# Patient Record
Sex: MALE | Race: BLACK OR AFRICAN AMERICAN | NOT HISPANIC OR LATINO | Employment: FULL TIME | URBAN - METROPOLITAN AREA
[De-identification: names, ages, dates, MRNs, and addresses within clinical notes are randomized per-mention and may not be internally consistent; named-entity substitution may affect disease eponyms.]

---

## 2018-07-25 ENCOUNTER — HOSPITAL ENCOUNTER (EMERGENCY)
Facility: HOSPITAL | Age: 38
Discharge: HOME | End: 2018-07-25
Attending: EMERGENCY MEDICINE | Admitting: EMERGENCY MEDICINE
Payer: COMMERCIAL

## 2018-07-25 VITALS
HEART RATE: 96 BPM | BODY MASS INDEX: 32.61 KG/M2 | WEIGHT: 275 LBS | OXYGEN SATURATION: 100 % | TEMPERATURE: 97.3 F | SYSTOLIC BLOOD PRESSURE: 141 MMHG | DIASTOLIC BLOOD PRESSURE: 88 MMHG | RESPIRATION RATE: 16 BRPM

## 2018-07-25 DIAGNOSIS — B35.3 ATHLETE'S FOOT ON RIGHT: Primary | ICD-10-CM

## 2018-07-25 PROCEDURE — 99283 EMERGENCY DEPT VISIT LOW MDM: CPT

## 2018-07-25 RX ORDER — CLOTRIMAZOLE 1 %
CREAM (GRAM) TOPICAL
Qty: 15 G | Refills: 0 | Status: SHIPPED | OUTPATIENT
Start: 2018-07-25 | End: 2019-03-25

## 2018-07-26 NOTE — ED PROVIDER NOTES
History  Chief Complaint   Patient presents with    Toe Injury     small avulsion behind 4th digit and in between 3rd and 4th digit (he stuck a safty pin between toes to pop blister)     Pt in ER with c/o pain in between his 3rd and 4th toes  He noticed a blister which he popped, after which he noticed a small cut behind the 4th toe  Pt works as a , wears socks and boots for long hours  He denies trauma            Prior to Admission Medications   Prescriptions Last Dose Informant Patient Reported? Taking? albuterol (PROVENTIL HFA,VENTOLIN HFA) 90 mcg/act inhaler   Yes No   Sig: Inhale 2 puffs every 6 (six) hours as needed for wheezing      Facility-Administered Medications: None       Past Medical History:   Diagnosis Date    Asthma        Past Surgical History:   Procedure Laterality Date    CHOLECYSTECTOMY      CHOLECYSTECTOMY LAPAROSCOPIC N/A 10/7/2016    Procedure: CHOLECYSTECTOMY LAPAROSCOPIC, POSS  OPEN;  Surgeon: Berny Dey MD;  Location: [address] [place] [address];  Service:     NO PAST SURGERIES         History reviewed  No pertinent family history  I have reviewed and agree with the history as documented  Social History   Substance Use Topics    Smoking status: Former Smoker     Packs/day: 1 00     Years: 5 00     Types: Cigarettes, Cigars     Quit date: 10/5/2015    Smokeless tobacco: Never Used    Alcohol use 0 6 oz/week     1 Cans of beer per week      Comment: weekends        Review of Systems   Skin: Positive for wound  All other systems reviewed and are negative  Physical Exam  Physical Exam   Constitutional: He appears well-developed and well-nourished  No distress  Musculoskeletal:        Right foot: There is normal range of motion, no tenderness, no bony tenderness, no swelling, normal capillary refill, no crepitus, no deformity and no laceration  Feet:    Intertriginous area between 3rd and 4th finger moist  No cellulitis noted   No bony tenderness   Nursing note and vitals reviewed  Vital Signs  ED Triage Vitals [07/25/18 2049]   Temperature Pulse Respirations Blood Pressure SpO2   (!) 97 3 °F (36 3 °C) 96 16 141/88 100 %      Temp Source Heart Rate Source Patient Position - Orthostatic VS BP Location FiO2 (%)   Tympanic Monitor Sitting Left arm --      Pain Score       No Pain           Vitals:    07/25/18 2049   BP: 141/88   Pulse: 96   Patient Position - Orthostatic VS: Sitting       Visual Acuity      ED Medications  Medications - No data to display    Diagnostic Studies  Results Reviewed     None                 No orders to display              Procedures  Procedures       Phone Contacts  ED Phone Contact    ED Course                               MDM  Number of Diagnoses or Management Options  Athlete's foot on right:   Diagnosis management comments: Will start Lotrimin and refer pt to pods  Advised pt to change his socks often and keep foot dry  CritCare Time    Disposition  Final diagnoses:   Athlete's foot on right     Time reflects when diagnosis was documented in both MDM as applicable and the Disposition within this note     Time User Action Codes Description Comment    7/25/2018  9:18 PM Cj Cintron Add [B35 3] Athlete's foot on right       ED Disposition     ED Disposition Condition Comment    Discharge  [facility] [address] [facility] to home/self care      Condition at discharge: Stable        Follow-up Information     Follow up With Specialties Details Why Contact Info    Ginny Patch, DPM Podiatry Schedule an appointment as soon as possible for a visit in 1 day for follow up [facility]            Discharge Medication List as of 7/25/2018  9:20 PM      START taking these medications    Details   clotrimazole (LOTRIMIN) 1 % cream Apply to affected area 2 times daily, Print         CONTINUE these medications which have NOT CHANGED    Details   albuterol (PROVENTIL HFA,VENTOLIN HFA) 90 mcg/act inhaler Inhale 2 puffs every 6 (six) hours as needed for wheezing, Until Discontinued, Historical Med           No discharge procedures on file      ED Provider  Electronically Signed by           Adrienne Chua DO  07/26/18 3609

## 2018-07-26 NOTE — DISCHARGE INSTRUCTIONS
Athlete's Foot   WHAT YOU NEED TO KNOW:   Athlete's foot is a foot infection caused by a fungus  DISCHARGE INSTRUCTIONS:   Return to the emergency department if:   · You have a fever or chills  · You have red streaks going up your leg  Contact your healthcare provider if:   · Your infection spreads to other parts of your body  · Your infection is not better in 14 days or is not completely gone in 90 days  · The skin on your foot or leg is red and hot  · You have questions or concerns about your condition or care  Medicines:   · Antifungal medicine  may be given as a cream or pill  You may need a doctor's order for this medicine  Take the medicine until it is gone, even if your feet look like they are healed  · Take your medicine as directed  Contact your healthcare provider if you think your medicine is not helping or if you have side effects  Tell him or her if you are allergic to any medicine  Keep a list of the medicines, vitamins, and herbs you take  Include the amounts, and when and why you take them  Bring the list or the pill bottles to follow-up visits  Carry your medicine list with you in case of an emergency  Follow up with your healthcare provider as directed:  Write down your questions so you remember to ask them during your visits  Prevent the spread of athlete's foot:   · Prevent the spread of this infection to other parts of your body  When you shower, dry your groin area and other parts of your body before you dry your feet  · Keep your feet clean and dry  Wash your feet each day and dry them well, especially between your toes  After your feet are dry, put powder on your feet and between your toes  Wear clean cotton or wool socks each day  Put your socks on first so you do not spread the infection to other areas of your body  Wear sandals, canvas tennis shoes, or other shoes that allow air to flow to your feet  This helps keep your feet dry   Do not use shoes that are tight, or made of plastic or rubber  · Soak your feet in an astringent (drying) solution as directed  if you have blisters  You may need to do this for 20 to 30 minutes, 2 times each day to help dry out the blisters  · Wear shoes in public areas  Wear shower shoes or sandals in warm, damp areas  This includes shower stalls, near swimming pools, and locker rooms  Do not share socks or shoes  Do not use public swimming pools  © 2017 [facility] Information is for End User's use only and may not be sold, redistributed or otherwise used for commercial purposes  All illustrations and images included in CareNotes® are the copyrighted property of [employer] , [employer]  or Maxime Harrison  The above information is an  only  It is not intended as medical advice for individual conditions or treatments  Talk to your doctor, nurse or pharmacist before following any medical regimen to see if it is safe and effective for you

## 2019-01-08 ENCOUNTER — HOSPITAL ENCOUNTER (EMERGENCY)
Facility: HOSPITAL | Age: 39
Discharge: HOME | End: 2019-01-08
Attending: EMERGENCY MEDICINE

## 2019-01-08 VITALS
OXYGEN SATURATION: 97 % | HEART RATE: 103 BPM | DIASTOLIC BLOOD PRESSURE: 89 MMHG | RESPIRATION RATE: 20 BRPM | TEMPERATURE: 98.6 F | SYSTOLIC BLOOD PRESSURE: 131 MMHG

## 2019-01-08 DIAGNOSIS — B35.3 TINEA PEDIS: Primary | ICD-10-CM

## 2019-01-08 PROCEDURE — 99282 EMERGENCY DEPT VISIT SF MDM: CPT

## 2019-01-08 RX ORDER — CLOTRIMAZOLE AND BETAMETHASONE DIPROPIONATE 10; .64 MG/G; MG/G
CREAM TOPICAL
Qty: 15 G | Refills: 0 | Status: SHIPPED | OUTPATIENT
Start: 2019-01-08 | End: 2019-03-25

## 2019-01-09 NOTE — ED PROVIDER NOTES
History  Chief Complaint   Patient presents with    Rash     pt presents to the ed with itchy dry drash on his right foot between the toes      49-year-old male with past medical history of asthma, eczema, presents to the ER with complaint of a rash to his right foot  Patient works as a   Patient has been treated for fungal infection to his toes in the past   Patient notes dry itchy rash in between and on top of his toes over the past few days  Patient came to the ER for further evaluation        History provided by:  Patient  Rash   Associated symptoms: no abdominal pain, no diarrhea, no fatigue, no fever, no headaches, no joint pain, no nausea, no shortness of breath, no sore throat, not vomiting and not wheezing        Prior to Admission Medications   Prescriptions Last Dose Informant Patient Reported? Taking? albuterol (PROVENTIL HFA,VENTOLIN HFA) 90 mcg/act inhaler   Yes No   Sig: Inhale 2 puffs every 6 (six) hours as needed for wheezing   clotrimazole (LOTRIMIN) 1 % cream   No No   Sig: Apply to affected area 2 times daily      Facility-Administered Medications: None       Past Medical History:   Diagnosis Date    Asthma        Past Surgical History:   Procedure Laterality Date    CHOLECYSTECTOMY      CHOLECYSTECTOMY LAPAROSCOPIC N/A 10/7/2016    Procedure: CHOLECYSTECTOMY LAPAROSCOPIC, POSS  OPEN;  Surgeon: Delicia Wong MD;  Location: [address] [place] [address];  Service:     NO PAST SURGERIES         History reviewed  No pertinent family history  I have reviewed and agree with the history as documented  Social History   Substance Use Topics    Smoking status: Former Smoker     Packs/day: 1 00     Years: 5 00     Types: Cigarettes, Cigars     Quit date: 10/5/2015    Smokeless tobacco: Never Used    Alcohol use 0 6 oz/week     1 Cans of beer per week      Comment: weekends        Review of Systems   Constitutional: Negative for activity change, fatigue and fever     HENT: Negative for congestion, ear discharge and sore throat  Eyes: Negative for pain and redness  Respiratory: Negative for cough, chest tightness, shortness of breath and wheezing  Cardiovascular: Negative for chest pain  Gastrointestinal: Negative for abdominal pain, diarrhea, nausea and vomiting  Endocrine: Negative for cold intolerance  Genitourinary: Negative for dysuria and urgency  Musculoskeletal: Negative for arthralgias and back pain  Skin: Positive for rash  Neurological: Negative for dizziness, weakness and headaches  Psychiatric/Behavioral: Negative for agitation and behavioral problems  Physical Exam  Physical Exam   Constitutional: He is oriented to person, place, and time  He appears well-developed and well-nourished  HENT:   Head: Normocephalic and atraumatic  Nose: Nose normal    Mouth/Throat: Oropharynx is clear and moist    Eyes: Conjunctivae and EOM are normal    Neck: Normal range of motion  Neck supple  Cardiovascular: Normal rate, regular rhythm and normal heart sounds  Pulmonary/Chest: Effort normal and breath sounds normal    Abdominal: Soft  Bowel sounds are normal  He exhibits no distension  There is no tenderness  Musculoskeletal: Normal range of motion  Neurological: He is alert and oriented to person, place, and time  Skin: Skin is warm  Dry scaly lesions noted diffusely in the web spaces and the dorsal aspect of toes on right foot  Psychiatric: He has a normal mood and affect  His behavior is normal  Judgment and thought content normal    Nursing note and vitals reviewed        Vital Signs  ED Triage Vitals [01/08/19 2001]   Temperature Pulse Respirations Blood Pressure SpO2   98 6 °F (37 °C) 103 20 131/89 97 %      Temp Source Heart Rate Source Patient Position - Orthostatic VS BP Location FiO2 (%)   Tympanic Monitor -- -- --      Pain Score       --           Vitals:    01/08/19 2001   BP: 131/89   Pulse: 103       Visual Acuity      ED Medications  Medications - No data to display    Diagnostic Studies  Results Reviewed     None                 No orders to display              Procedures  Procedures       Phone Contacts  ED Phone Contact    ED Course                               MDM  Number of Diagnoses or Management Options  Tinea pedis:   Risk of Complications, Morbidity, and/or Mortality  General comments: Rash this is consistent with tenia pedis  At this time patient is placed on route Lotrisone cream b i d  X2 weeks with follow-up to Podiatry  Close return instructions given to return to the ER for any worsening symptoms  Patient agrees with discharge plan  Patient well appearing at time of discharge  Patient Progress  Patient progress: stable    CritCare Time    Disposition  Final diagnoses:   Tinea pedis     Time reflects when diagnosis was documented in both MDM as applicable and the Disposition within this note     Time User Action Codes Description Comment    1/8/2019  8:14 PM Elex Shark Add [B35 3] Tinea pedis       ED Disposition     ED Disposition Condition Comment    [facility] to home/self care  Condition at discharge: Good        Follow-up Information     Follow up With Specialties Details Why [address] In 1 week  [place]            Discharge Medication List as of 1/8/2019  8:16 PM      START taking these medications    Details   clotrimazole-betamethasone (LOTRISONE) 1-0 05 % cream Apply to affected area 2 times daily for two weeks, Normal         CONTINUE these medications which have NOT CHANGED    Details   albuterol (PROVENTIL HFA,VENTOLIN HFA) 90 mcg/act inhaler Inhale 2 puffs every 6 (six) hours as needed for wheezing, Until Discontinued, Historical Med      clotrimazole (LOTRIMIN) 1 % cream Apply to affected area 2 times daily, Print           No discharge procedures on file      ED Provider  Electronically Signed by           Luiza Mccoy DO  01/08/19 2103

## 2019-01-09 NOTE — DISCHARGE INSTRUCTIONS
Please take a list of all of your medications and discharge paperwork with you to all of your follow-up medical visits  Please take all of your medications as directed  Please call your family doctor or return to the ER if you have increased shortness of breath, chest pain, fevers, chills, nausea, vomiting, diarrhea, or any other worsening symptoms  Athlete's Foot   WHAT YOU NEED TO KNOW:   What is athlete's foot? Athlete's foot is a foot infection caused by a fungus  What increases my risk for athlete's foot? Athlete's foot is spread when an infected person shares towels or walks barefoot in shower stalls or public locker rooms  Your risk of athlete's foot is greater if you do not wash your feet or do not change your socks every day  What are the signs and symptoms of athlete's foot? · Cracks or blisters    · Redness, swelling, itching, or burning    · Scaly or peeling skin    · Bad smelling feet    · Thick, dark skin on the bottoms or sides of your feet    · Thick, abnormal toenails  How is athlete's foot diagnosed? Your healthcare provider may be able to tell you have athlete's foot by looking at your feet  He or she may gently scrape off some of your skin and look at the sample through a microscope  This will help the provider know the type of fungus that is causing your infection  How is athlete's foot treated? Athlete's foot is usually treated with an antifungal medicine  This medicine may be given as a cream or pill  You may need a doctor's order for this medicine  Take the medicine until it is gone, even if your feet look like they are healed  How can I prevent the spread of athlete's foot? · Prevent the spread of this infection to other parts of your body  When you shower, dry your groin area and other parts of your body before you dry your feet  · Keep your feet clean and dry  Wash your feet each day and dry them well, especially between your toes   After your feet are dry, put powder on your feet and between your toes  Wear clean cotton or wool socks each day  Put your socks on first so you do not spread the infection to other areas of your body  Wear sandals, canvas tennis shoes, or other shoes that allow air to flow to your feet  This helps keep your feet dry  Do not use shoes that are tight, or made of plastic or rubber  · Soak your feet in an astringent (drying) solution as directed  if you have blisters  You may need to do this for 20 to 30 minutes, 2 times each day to help dry out the blisters  · Wear shoes in public areas  Wear shower shoes or sandals in warm, damp areas  This includes shower stalls, near swimming pools, and locker rooms  Do not share socks or shoes  Do not use public swimming pools  When should I seek immediate care? · You have a fever or chills  · You have red streaks going up your leg  When should I contact my healthcare provider? · Your infection spreads to other parts of your body  · Your infection is not better in 14 days or is not completely gone in 90 days  · The skin on your foot or leg is red and hot  · You have questions or concerns about your condition or care  CARE AGREEMENT:   You have the right to help plan your care  Learn about your health condition and how it may be treated  Discuss treatment options with your caregivers to decide what care you want to receive  You always have the right to refuse treatment  The above information is an  only  It is not intended as medical advice for individual conditions or treatments  Talk to your doctor, nurse or pharmacist before following any medical regimen to see if it is safe and effective for you  © 2017 [address] Information is for End User's use only and may not be sold, redistributed or otherwise used for commercial purposes   All illustrations and images included in CareNotes® are the copyrighted property of A D A M , Inc  or [employer]

## 2019-03-25 ENCOUNTER — APPOINTMENT (EMERGENCY)
Dept: RADIOLOGY | Facility: HOSPITAL | Age: 39
DRG: 720 | End: 2019-03-25
Attending: EMERGENCY MEDICINE
Payer: SUBSIDIZED

## 2019-03-25 ENCOUNTER — HOSPITAL ENCOUNTER (INPATIENT)
Facility: HOSPITAL | Age: 39
LOS: 3 days | Discharge: HOME | DRG: 720 | End: 2019-03-28
Attending: EMERGENCY MEDICINE | Admitting: STUDENT IN AN ORGANIZED HEALTH CARE EDUCATION/TRAINING PROGRAM
Payer: SUBSIDIZED

## 2019-03-25 ENCOUNTER — APPOINTMENT (EMERGENCY)
Dept: RADIOLOGY | Facility: HOSPITAL | Age: 39
DRG: 720 | End: 2019-03-25
Payer: SUBSIDIZED

## 2019-03-25 DIAGNOSIS — L03.115 CELLULITIS OF RIGHT LOWER EXTREMITY: Primary | ICD-10-CM

## 2019-03-25 PROBLEM — R50.9 DEHYDRATION FEVER: Status: ACTIVE | Noted: 2019-03-25

## 2019-03-25 PROBLEM — J45.20 MILD INTERMITTENT ASTHMA: Status: ACTIVE | Noted: 2019-03-25

## 2019-03-25 PROBLEM — L30.9 ECZEMA: Status: ACTIVE | Noted: 2019-03-25

## 2019-03-25 PROBLEM — D72.829 LEUKOCYTOSIS: Status: ACTIVE | Noted: 2019-03-25

## 2019-03-25 LAB
ALBUMIN SERPL BCP-MCNC: 3.6 G/DL (ref 3.5–5)
ALP SERPL-CCNC: 90 U/L (ref 46–116)
ALT SERPL W P-5'-P-CCNC: 22 U/L (ref 12–78)
ANION GAP SERPL CALCULATED.3IONS-SCNC: 10 MMOL/L (ref 4–13)
AST SERPL W P-5'-P-CCNC: 18 U/L (ref 5–45)
BACTERIA UR QL AUTO: ABNORMAL /HPF
BASOPHILS # BLD AUTO: 0.06 THOUSANDS/ΜL (ref 0–0.1)
BASOPHILS NFR BLD AUTO: 0 % (ref 0–1)
BILIRUB SERPL-MCNC: 1 MG/DL (ref 0.2–1)
BILIRUB UR QL STRIP: NEGATIVE
BUN SERPL-MCNC: 14 MG/DL (ref 5–25)
CALCIUM SERPL-MCNC: 8.8 MG/DL (ref 8.3–10.1)
CHLORIDE SERPL-SCNC: 99 MMOL/L (ref 100–108)
CLARITY UR: CLEAR
CO2 SERPL-SCNC: 26 MMOL/L (ref 21–32)
COLOR UR: YELLOW
CREAT SERPL-MCNC: 1.31 MG/DL (ref 0.6–1.3)
EOSINOPHIL # BLD AUTO: 0 THOUSAND/ΜL (ref 0–0.61)
EOSINOPHIL NFR BLD AUTO: 0 % (ref 0–6)
ERYTHROCYTE [DISTWIDTH] IN BLOOD BY AUTOMATED COUNT: 13.6 % (ref 11.6–15.1)
GFR SERPL CREATININE-BSD FRML MDRD: 78 ML/MIN/1.73SQ M
GLUCOSE SERPL-MCNC: 107 MG/DL (ref 65–140)
GLUCOSE UR STRIP-MCNC: NEGATIVE MG/DL
HCT VFR BLD AUTO: 44.2 % (ref 36.5–49.3)
HGB BLD-MCNC: 15.6 G/DL (ref 12–17)
HGB UR QL STRIP.AUTO: NEGATIVE
IMM GRANULOCYTES # BLD AUTO: 0.11 THOUSAND/UL (ref 0–0.2)
IMM GRANULOCYTES NFR BLD AUTO: 1 % (ref 0–2)
KETONES UR STRIP-MCNC: NEGATIVE MG/DL
LACTATE SERPL-SCNC: 1 MMOL/L (ref 0.5–2)
LEUKOCYTE ESTERASE UR QL STRIP: NEGATIVE
LYMPHOCYTES # BLD AUTO: 2.52 THOUSANDS/ΜL (ref 0.6–4.47)
LYMPHOCYTES NFR BLD AUTO: 13 % (ref 14–44)
MCH RBC QN AUTO: 30.2 PG (ref 26.8–34.3)
MCHC RBC AUTO-ENTMCNC: 35.3 G/DL (ref 31.4–37.4)
MCV RBC AUTO: 86 FL (ref 82–98)
MONOCYTES # BLD AUTO: 1.24 THOUSAND/ΜL (ref 0.17–1.22)
MONOCYTES NFR BLD AUTO: 6 % (ref 4–12)
MUCOUS THREADS UR QL AUTO: ABNORMAL
NEUTROPHILS # BLD AUTO: 15.71 THOUSANDS/ΜL (ref 1.85–7.62)
NEUTS SEG NFR BLD AUTO: 80 % (ref 43–75)
NITRITE UR QL STRIP: NEGATIVE
NON-SQ EPI CELLS URNS QL MICRO: ABNORMAL /HPF
NRBC BLD AUTO-RTO: 0 /100 WBCS
PH UR STRIP.AUTO: 5.5 [PH]
PLATELET # BLD AUTO: 307 THOUSANDS/UL (ref 149–390)
PMV BLD AUTO: 9.5 FL (ref 8.9–12.7)
POTASSIUM SERPL-SCNC: 3.5 MMOL/L (ref 3.5–5.3)
PROCALCITONIN SERPL-MCNC: 0.07 NG/ML
PROT SERPL-MCNC: 8.4 G/DL (ref 6.4–8.2)
PROT UR STRIP-MCNC: ABNORMAL MG/DL
RBC # BLD AUTO: 5.17 MILLION/UL (ref 3.88–5.62)
RBC #/AREA URNS AUTO: ABNORMAL /HPF
SODIUM SERPL-SCNC: 135 MMOL/L (ref 136–145)
SP GR UR STRIP.AUTO: >=1.03 (ref 1–1.03)
UROBILINOGEN UR QL STRIP.AUTO: 2 E.U./DL
WBC # BLD AUTO: 19.64 THOUSAND/UL (ref 4.31–10.16)
WBC #/AREA URNS AUTO: ABNORMAL /HPF

## 2019-03-25 PROCEDURE — 80053 COMPREHEN METABOLIC PANEL: CPT | Performed by: EMERGENCY MEDICINE

## 2019-03-25 PROCEDURE — 93971 EXTREMITY STUDY: CPT

## 2019-03-25 PROCEDURE — 96365 THER/PROPH/DIAG IV INF INIT: CPT

## 2019-03-25 PROCEDURE — 96375 TX/PRO/DX INJ NEW DRUG ADDON: CPT

## 2019-03-25 PROCEDURE — 99223 1ST HOSP IP/OBS HIGH 75: CPT | Performed by: STUDENT IN AN ORGANIZED HEALTH CARE EDUCATION/TRAINING PROGRAM

## 2019-03-25 PROCEDURE — 87040 BLOOD CULTURE FOR BACTERIA: CPT | Performed by: EMERGENCY MEDICINE

## 2019-03-25 PROCEDURE — 71045 X-RAY EXAM CHEST 1 VIEW: CPT

## 2019-03-25 PROCEDURE — 81001 URINALYSIS AUTO W/SCOPE: CPT | Performed by: EMERGENCY MEDICINE

## 2019-03-25 PROCEDURE — 87081 CULTURE SCREEN ONLY: CPT | Performed by: NURSE PRACTITIONER

## 2019-03-25 PROCEDURE — 96361 HYDRATE IV INFUSION ADD-ON: CPT

## 2019-03-25 PROCEDURE — 85025 COMPLETE CBC W/AUTO DIFF WBC: CPT | Performed by: EMERGENCY MEDICINE

## 2019-03-25 PROCEDURE — 36415 COLL VENOUS BLD VENIPUNCTURE: CPT | Performed by: EMERGENCY MEDICINE

## 2019-03-25 PROCEDURE — 84145 PROCALCITONIN (PCT): CPT | Performed by: NURSE PRACTITIONER

## 2019-03-25 PROCEDURE — 83605 ASSAY OF LACTIC ACID: CPT | Performed by: EMERGENCY MEDICINE

## 2019-03-25 PROCEDURE — 99285 EMERGENCY DEPT VISIT HI MDM: CPT

## 2019-03-25 RX ORDER — KETOROLAC TROMETHAMINE 30 MG/ML
15 INJECTION, SOLUTION INTRAMUSCULAR; INTRAVENOUS EVERY 6 HOURS PRN
Status: DISCONTINUED | OUTPATIENT
Start: 2019-03-25 | End: 2019-03-28 | Stop reason: HOSPADM

## 2019-03-25 RX ORDER — ONDANSETRON 2 MG/ML
4 INJECTION INTRAMUSCULAR; INTRAVENOUS EVERY 6 HOURS PRN
Status: DISCONTINUED | OUTPATIENT
Start: 2019-03-25 | End: 2019-03-28 | Stop reason: HOSPADM

## 2019-03-25 RX ORDER — ALBUTEROL SULFATE 90 UG/1
2 AEROSOL, METERED RESPIRATORY (INHALATION) EVERY 6 HOURS PRN
Status: DISCONTINUED | OUTPATIENT
Start: 2019-03-25 | End: 2019-03-28 | Stop reason: HOSPADM

## 2019-03-25 RX ORDER — KETOROLAC TROMETHAMINE 30 MG/ML
15 INJECTION, SOLUTION INTRAMUSCULAR; INTRAVENOUS ONCE
Status: COMPLETED | OUTPATIENT
Start: 2019-03-25 | End: 2019-03-25

## 2019-03-25 RX ORDER — SODIUM CHLORIDE 9 MG/ML
125 INJECTION, SOLUTION INTRAVENOUS CONTINUOUS
Status: DISCONTINUED | OUTPATIENT
Start: 2019-03-25 | End: 2019-03-28 | Stop reason: HOSPADM

## 2019-03-25 RX ORDER — POLYETHYLENE GLYCOL 3350 17 G/17G
17 POWDER, FOR SOLUTION ORAL DAILY PRN
Status: DISCONTINUED | OUTPATIENT
Start: 2019-03-25 | End: 2019-03-28 | Stop reason: HOSPADM

## 2019-03-25 RX ORDER — ACETAMINOPHEN 325 MG/1
650 TABLET ORAL ONCE
Status: COMPLETED | OUTPATIENT
Start: 2019-03-25 | End: 2019-03-25

## 2019-03-25 RX ORDER — ACETAMINOPHEN 325 MG/1
650 TABLET ORAL EVERY 6 HOURS SCHEDULED
Status: DISCONTINUED | OUTPATIENT
Start: 2019-03-25 | End: 2019-03-28 | Stop reason: HOSPADM

## 2019-03-25 RX ADMIN — AMPICILLIN AND SULBACTAM 3 G: 2; 1 INJECTION, POWDER, FOR SOLUTION INTRAMUSCULAR; INTRAVENOUS at 14:27

## 2019-03-25 RX ADMIN — SODIUM CHLORIDE 1000 ML: 0.9 INJECTION, SOLUTION INTRAVENOUS at 13:29

## 2019-03-25 RX ADMIN — AMPICILLIN AND SULBACTAM 3 G: 2; 1 INJECTION, POWDER, FOR SOLUTION INTRAMUSCULAR; INTRAVENOUS at 22:21

## 2019-03-25 RX ADMIN — AMPICILLIN AND SULBACTAM 3 G: 2; 1 INJECTION, POWDER, FOR SOLUTION INTRAMUSCULAR; INTRAVENOUS at 17:17

## 2019-03-25 RX ADMIN — ACETAMINOPHEN 650 MG: 325 TABLET, FILM COATED ORAL at 23:31

## 2019-03-25 RX ADMIN — ACETAMINOPHEN 650 MG: 325 TABLET, FILM COATED ORAL at 13:21

## 2019-03-25 RX ADMIN — SODIUM CHLORIDE 125 ML/HR: 0.9 INJECTION, SOLUTION INTRAVENOUS at 16:19

## 2019-03-25 RX ADMIN — KETOROLAC TROMETHAMINE 15 MG: 30 INJECTION, SOLUTION INTRAMUSCULAR at 13:34

## 2019-03-25 RX ADMIN — ACETAMINOPHEN 650 MG: 325 TABLET, FILM COATED ORAL at 17:15

## 2019-03-25 RX ADMIN — KETOROLAC TROMETHAMINE 15 MG: 30 INJECTION, SOLUTION INTRAMUSCULAR at 18:03

## 2019-03-26 LAB
ANION GAP SERPL CALCULATED.3IONS-SCNC: 9 MMOL/L (ref 4–13)
BASOPHILS # BLD AUTO: 0.04 THOUSANDS/ΜL (ref 0–0.1)
BASOPHILS NFR BLD AUTO: 0 % (ref 0–1)
BUN SERPL-MCNC: 11 MG/DL (ref 5–25)
CALCIUM SERPL-MCNC: 8.6 MG/DL (ref 8.3–10.1)
CHLORIDE SERPL-SCNC: 104 MMOL/L (ref 100–108)
CO2 SERPL-SCNC: 24 MMOL/L (ref 21–32)
CREAT SERPL-MCNC: 1.16 MG/DL (ref 0.6–1.3)
EOSINOPHIL # BLD AUTO: 0.01 THOUSAND/ΜL (ref 0–0.61)
EOSINOPHIL NFR BLD AUTO: 0 % (ref 0–6)
ERYTHROCYTE [DISTWIDTH] IN BLOOD BY AUTOMATED COUNT: 13.4 % (ref 11.6–15.1)
GFR SERPL CREATININE-BSD FRML MDRD: 91 ML/MIN/1.73SQ M
GLUCOSE SERPL-MCNC: 143 MG/DL (ref 65–140)
HCT VFR BLD AUTO: 37.7 % (ref 36.5–49.3)
HGB BLD-MCNC: 13.6 G/DL (ref 12–17)
IMM GRANULOCYTES # BLD AUTO: 0.06 THOUSAND/UL (ref 0–0.2)
IMM GRANULOCYTES NFR BLD AUTO: 0 % (ref 0–2)
LYMPHOCYTES # BLD AUTO: 1.41 THOUSANDS/ΜL (ref 0.6–4.47)
LYMPHOCYTES NFR BLD AUTO: 10 % (ref 14–44)
MAGNESIUM SERPL-MCNC: 1.7 MG/DL (ref 1.6–2.6)
MCH RBC QN AUTO: 30.9 PG (ref 26.8–34.3)
MCHC RBC AUTO-ENTMCNC: 36.1 G/DL (ref 31.4–37.4)
MCV RBC AUTO: 86 FL (ref 82–98)
MONOCYTES # BLD AUTO: 0.88 THOUSAND/ΜL (ref 0.17–1.22)
MONOCYTES NFR BLD AUTO: 7 % (ref 4–12)
NEUTROPHILS # BLD AUTO: 11.13 THOUSANDS/ΜL (ref 1.85–7.62)
NEUTS SEG NFR BLD AUTO: 83 % (ref 43–75)
NRBC BLD AUTO-RTO: 0 /100 WBCS
PLATELET # BLD AUTO: 241 THOUSANDS/UL (ref 149–390)
PMV BLD AUTO: 9.9 FL (ref 8.9–12.7)
POTASSIUM SERPL-SCNC: 3.4 MMOL/L (ref 3.5–5.3)
PROCALCITONIN SERPL-MCNC: 0.64 NG/ML
RBC # BLD AUTO: 4.4 MILLION/UL (ref 3.88–5.62)
SODIUM SERPL-SCNC: 137 MMOL/L (ref 136–145)
WBC # BLD AUTO: 13.53 THOUSAND/UL (ref 4.31–10.16)

## 2019-03-26 PROCEDURE — 80048 BASIC METABOLIC PNL TOTAL CA: CPT | Performed by: NURSE PRACTITIONER

## 2019-03-26 PROCEDURE — 85025 COMPLETE CBC W/AUTO DIFF WBC: CPT | Performed by: STUDENT IN AN ORGANIZED HEALTH CARE EDUCATION/TRAINING PROGRAM

## 2019-03-26 PROCEDURE — 93971 EXTREMITY STUDY: CPT | Performed by: SURGERY

## 2019-03-26 PROCEDURE — 84145 PROCALCITONIN (PCT): CPT | Performed by: NURSE PRACTITIONER

## 2019-03-26 PROCEDURE — 83735 ASSAY OF MAGNESIUM: CPT | Performed by: NURSE PRACTITIONER

## 2019-03-26 PROCEDURE — 99232 SBSQ HOSP IP/OBS MODERATE 35: CPT | Performed by: NURSE PRACTITIONER

## 2019-03-26 RX ORDER — DIAPER,BRIEF,INFANT-TODD,DISP
EACH MISCELLANEOUS 4 TIMES DAILY PRN
Status: DISCONTINUED | OUTPATIENT
Start: 2019-03-26 | End: 2019-03-28 | Stop reason: HOSPADM

## 2019-03-26 RX ORDER — CEFAZOLIN SODIUM 2 G/50ML
2000 SOLUTION INTRAVENOUS EVERY 8 HOURS
Status: DISCONTINUED | OUTPATIENT
Start: 2019-03-26 | End: 2019-03-28 | Stop reason: HOSPADM

## 2019-03-26 RX ORDER — POTASSIUM CHLORIDE 20 MEQ/1
40 TABLET, EXTENDED RELEASE ORAL ONCE
Status: COMPLETED | OUTPATIENT
Start: 2019-03-26 | End: 2019-03-26

## 2019-03-26 RX ADMIN — ACETAMINOPHEN 650 MG: 325 TABLET, FILM COATED ORAL at 12:06

## 2019-03-26 RX ADMIN — SODIUM CHLORIDE 125 ML/HR: 0.9 INJECTION, SOLUTION INTRAVENOUS at 08:46

## 2019-03-26 RX ADMIN — CEFAZOLIN SODIUM 2000 MG: 2 SOLUTION INTRAVENOUS at 17:43

## 2019-03-26 RX ADMIN — ENOXAPARIN SODIUM 40 MG: 40 INJECTION SUBCUTANEOUS at 08:44

## 2019-03-26 RX ADMIN — KETOROLAC TROMETHAMINE 15 MG: 30 INJECTION, SOLUTION INTRAMUSCULAR at 12:05

## 2019-03-26 RX ADMIN — SODIUM CHLORIDE 125 ML/HR: 0.9 INJECTION, SOLUTION INTRAVENOUS at 00:38

## 2019-03-26 RX ADMIN — Medication 400 MG: at 08:44

## 2019-03-26 RX ADMIN — SODIUM CHLORIDE 125 ML/HR: 0.9 INJECTION, SOLUTION INTRAVENOUS at 17:54

## 2019-03-26 RX ADMIN — POTASSIUM CHLORIDE 40 MEQ: 1500 TABLET, EXTENDED RELEASE ORAL at 08:44

## 2019-03-26 RX ADMIN — Medication 400 MG: at 17:42

## 2019-03-26 RX ADMIN — AMPICILLIN AND SULBACTAM 3 G: 2; 1 INJECTION, POWDER, FOR SOLUTION INTRAMUSCULAR; INTRAVENOUS at 04:36

## 2019-03-26 RX ADMIN — ACETAMINOPHEN 650 MG: 325 TABLET, FILM COATED ORAL at 05:34

## 2019-03-26 RX ADMIN — CEFAZOLIN SODIUM 2000 MG: 2 SOLUTION INTRAVENOUS at 08:53

## 2019-03-26 RX ADMIN — ACETAMINOPHEN 650 MG: 325 TABLET, FILM COATED ORAL at 17:42

## 2019-03-26 RX ADMIN — KETOROLAC TROMETHAMINE 15 MG: 30 INJECTION, SOLUTION INTRAMUSCULAR at 05:00

## 2019-03-26 RX ADMIN — POLYETHYLENE GLYCOL 3350 17 G: 17 POWDER, FOR SOLUTION ORAL at 17:50

## 2019-03-26 RX ADMIN — KETOROLAC TROMETHAMINE 15 MG: 30 INJECTION, SOLUTION INTRAMUSCULAR at 17:59

## 2019-03-27 LAB
ANION GAP SERPL CALCULATED.3IONS-SCNC: 7 MMOL/L (ref 4–13)
BUN SERPL-MCNC: 12 MG/DL (ref 5–25)
CALCIUM SERPL-MCNC: 8.6 MG/DL (ref 8.3–10.1)
CHLORIDE SERPL-SCNC: 106 MMOL/L (ref 100–108)
CO2 SERPL-SCNC: 26 MMOL/L (ref 21–32)
CREAT SERPL-MCNC: 1.05 MG/DL (ref 0.6–1.3)
ERYTHROCYTE [DISTWIDTH] IN BLOOD BY AUTOMATED COUNT: 13.4 % (ref 11.6–15.1)
GFR SERPL CREATININE-BSD FRML MDRD: 102 ML/MIN/1.73SQ M
GLUCOSE SERPL-MCNC: 103 MG/DL (ref 65–140)
HCT VFR BLD AUTO: 37.3 % (ref 36.5–49.3)
HGB BLD-MCNC: 13.1 G/DL (ref 12–17)
MCH RBC QN AUTO: 30.4 PG (ref 26.8–34.3)
MCHC RBC AUTO-ENTMCNC: 35.1 G/DL (ref 31.4–37.4)
MCV RBC AUTO: 87 FL (ref 82–98)
MRSA NOSE QL CULT: NORMAL
PLATELET # BLD AUTO: 263 THOUSANDS/UL (ref 149–390)
PMV BLD AUTO: 9.9 FL (ref 8.9–12.7)
POTASSIUM SERPL-SCNC: 3.7 MMOL/L (ref 3.5–5.3)
PROCALCITONIN SERPL-MCNC: 0.08 NG/ML
RBC # BLD AUTO: 4.31 MILLION/UL (ref 3.88–5.62)
SODIUM SERPL-SCNC: 139 MMOL/L (ref 136–145)
WBC # BLD AUTO: 12.14 THOUSAND/UL (ref 4.31–10.16)

## 2019-03-27 PROCEDURE — 84145 PROCALCITONIN (PCT): CPT | Performed by: NURSE PRACTITIONER

## 2019-03-27 PROCEDURE — 85027 COMPLETE CBC AUTOMATED: CPT | Performed by: NURSE PRACTITIONER

## 2019-03-27 PROCEDURE — 90686 IIV4 VACC NO PRSV 0.5 ML IM: CPT | Performed by: STUDENT IN AN ORGANIZED HEALTH CARE EDUCATION/TRAINING PROGRAM

## 2019-03-27 PROCEDURE — 80048 BASIC METABOLIC PNL TOTAL CA: CPT | Performed by: NURSE PRACTITIONER

## 2019-03-27 PROCEDURE — 90732 PPSV23 VACC 2 YRS+ SUBQ/IM: CPT | Performed by: STUDENT IN AN ORGANIZED HEALTH CARE EDUCATION/TRAINING PROGRAM

## 2019-03-27 PROCEDURE — 99232 SBSQ HOSP IP/OBS MODERATE 35: CPT | Performed by: NURSE PRACTITIONER

## 2019-03-27 RX ADMIN — HYDROCORTISONE 1 APPLICATION: 1 CREAM TOPICAL at 10:10

## 2019-03-27 RX ADMIN — ACETAMINOPHEN 650 MG: 325 TABLET, FILM COATED ORAL at 05:45

## 2019-03-27 RX ADMIN — PNEUMOCOCCAL VACCINE POLYVALENT 0.5 ML
25; 25; 25; 25; 25; 25; 25; 25; 25; 25; 25; 25; 25; 25; 25; 25; 25; 25; 25; 25; 25; 25; 25 INJECTION, SOLUTION INTRAMUSCULAR; SUBCUTANEOUS at 09:58

## 2019-03-27 RX ADMIN — ACETAMINOPHEN 650 MG: 325 TABLET, FILM COATED ORAL at 01:00

## 2019-03-27 RX ADMIN — CEFAZOLIN SODIUM 2000 MG: 2 SOLUTION INTRAVENOUS at 09:19

## 2019-03-27 RX ADMIN — ACETAMINOPHEN 650 MG: 325 TABLET, FILM COATED ORAL at 17:39

## 2019-03-27 RX ADMIN — SODIUM CHLORIDE 125 ML/HR: 0.9 INJECTION, SOLUTION INTRAVENOUS at 17:39

## 2019-03-27 RX ADMIN — CEFAZOLIN SODIUM 2000 MG: 2 SOLUTION INTRAVENOUS at 16:05

## 2019-03-27 RX ADMIN — ENOXAPARIN SODIUM 40 MG: 40 INJECTION SUBCUTANEOUS at 09:19

## 2019-03-27 RX ADMIN — MAGNESIUM HYDROXIDE 30 ML: 400 SUSPENSION ORAL at 10:01

## 2019-03-27 RX ADMIN — INFLUENZA VIRUS VACCINE 0.5 ML: 15; 15; 15; 15 SUSPENSION INTRAMUSCULAR at 09:59

## 2019-03-27 RX ADMIN — ACETAMINOPHEN 650 MG: 325 TABLET, FILM COATED ORAL at 12:16

## 2019-03-27 RX ADMIN — KETOROLAC TROMETHAMINE 15 MG: 30 INJECTION, SOLUTION INTRAMUSCULAR at 01:08

## 2019-03-27 RX ADMIN — CEFAZOLIN SODIUM 2000 MG: 2 SOLUTION INTRAVENOUS at 01:06

## 2019-03-27 RX ADMIN — SODIUM CHLORIDE 125 ML/HR: 0.9 INJECTION, SOLUTION INTRAVENOUS at 09:20

## 2019-03-27 RX ADMIN — HYDROCORTISONE 1 APPLICATION: 1 CREAM TOPICAL at 19:31

## 2019-03-28 VITALS
HEART RATE: 92 BPM | BODY MASS INDEX: 29.52 KG/M2 | TEMPERATURE: 98.1 F | RESPIRATION RATE: 18 BRPM | WEIGHT: 250 LBS | HEIGHT: 77 IN | OXYGEN SATURATION: 96 % | SYSTOLIC BLOOD PRESSURE: 140 MMHG | DIASTOLIC BLOOD PRESSURE: 90 MMHG

## 2019-03-28 LAB
ANION GAP SERPL CALCULATED.3IONS-SCNC: 8 MMOL/L (ref 4–13)
BUN SERPL-MCNC: 9 MG/DL (ref 5–25)
CALCIUM SERPL-MCNC: 8.9 MG/DL (ref 8.3–10.1)
CHLORIDE SERPL-SCNC: 104 MMOL/L (ref 100–108)
CO2 SERPL-SCNC: 26 MMOL/L (ref 21–32)
CREAT SERPL-MCNC: 1.12 MG/DL (ref 0.6–1.3)
ERYTHROCYTE [DISTWIDTH] IN BLOOD BY AUTOMATED COUNT: 13.4 % (ref 11.6–15.1)
GFR SERPL CREATININE-BSD FRML MDRD: 94 ML/MIN/1.73SQ M
GLUCOSE SERPL-MCNC: 96 MG/DL (ref 65–140)
HCT VFR BLD AUTO: 37.9 % (ref 36.5–49.3)
HGB BLD-MCNC: 13.3 G/DL (ref 12–17)
MCH RBC QN AUTO: 30.5 PG (ref 26.8–34.3)
MCHC RBC AUTO-ENTMCNC: 35.1 G/DL (ref 31.4–37.4)
MCV RBC AUTO: 87 FL (ref 82–98)
PLATELET # BLD AUTO: 322 THOUSANDS/UL (ref 149–390)
PMV BLD AUTO: 9.4 FL (ref 8.9–12.7)
POTASSIUM SERPL-SCNC: 3.9 MMOL/L (ref 3.5–5.3)
PROCALCITONIN SERPL-MCNC: <0.05 NG/ML
RBC # BLD AUTO: 4.36 MILLION/UL (ref 3.88–5.62)
SODIUM SERPL-SCNC: 138 MMOL/L (ref 136–145)
WBC # BLD AUTO: 11.66 THOUSAND/UL (ref 4.31–10.16)

## 2019-03-28 PROCEDURE — 80048 BASIC METABOLIC PNL TOTAL CA: CPT | Performed by: NURSE PRACTITIONER

## 2019-03-28 PROCEDURE — 99238 HOSP IP/OBS DSCHRG MGMT 30/<: CPT | Performed by: NURSE PRACTITIONER

## 2019-03-28 PROCEDURE — 85027 COMPLETE CBC AUTOMATED: CPT | Performed by: NURSE PRACTITIONER

## 2019-03-28 PROCEDURE — 84145 PROCALCITONIN (PCT): CPT | Performed by: NURSE PRACTITIONER

## 2019-03-28 RX ORDER — CEFDINIR 300 MG/1
300 CAPSULE ORAL EVERY 12 HOURS SCHEDULED
Qty: 6 CAPSULE | Refills: 0 | Status: SHIPPED | OUTPATIENT
Start: 2019-03-28 | End: 2019-03-31

## 2019-03-28 RX ADMIN — CEFAZOLIN SODIUM 2000 MG: 2 SOLUTION INTRAVENOUS at 00:54

## 2019-03-28 RX ADMIN — SODIUM CHLORIDE 125 ML/HR: 0.9 INJECTION, SOLUTION INTRAVENOUS at 00:55

## 2019-03-28 RX ADMIN — ACETAMINOPHEN 650 MG: 325 TABLET, FILM COATED ORAL at 11:08

## 2019-03-28 RX ADMIN — ACETAMINOPHEN 650 MG: 325 TABLET, FILM COATED ORAL at 05:35

## 2019-03-28 RX ADMIN — CEFAZOLIN SODIUM 2000 MG: 2 SOLUTION INTRAVENOUS at 08:20

## 2019-03-28 RX ADMIN — ENOXAPARIN SODIUM 40 MG: 40 INJECTION SUBCUTANEOUS at 08:20

## 2019-03-28 RX ADMIN — ACETAMINOPHEN 650 MG: 325 TABLET, FILM COATED ORAL at 00:53

## 2019-03-29 PROBLEM — A41.9 SEPSIS (HCC): Status: ACTIVE | Noted: 2019-03-29

## 2019-03-30 LAB
BACTERIA BLD CULT: NORMAL
BACTERIA BLD CULT: NORMAL

## 2021-04-14 ENCOUNTER — APPOINTMENT (EMERGENCY)
Dept: RADIOLOGY | Facility: HOSPITAL | Age: 41
End: 2021-04-14
Payer: COMMERCIAL

## 2021-04-14 ENCOUNTER — HOSPITAL ENCOUNTER (EMERGENCY)
Facility: HOSPITAL | Age: 41
Discharge: HOME | End: 2021-04-14
Attending: EMERGENCY MEDICINE
Payer: COMMERCIAL

## 2021-04-14 VITALS
HEART RATE: 106 BPM | BODY MASS INDEX: 29.88 KG/M2 | TEMPERATURE: 99.7 F | RESPIRATION RATE: 18 BRPM | WEIGHT: 252 LBS | DIASTOLIC BLOOD PRESSURE: 97 MMHG | SYSTOLIC BLOOD PRESSURE: 163 MMHG | OXYGEN SATURATION: 96 %

## 2021-04-14 DIAGNOSIS — M79.604 RIGHT LEG PAIN: Primary | ICD-10-CM

## 2021-04-14 PROCEDURE — 73501 X-RAY EXAM HIP UNI 1 VIEW: CPT

## 2021-04-14 PROCEDURE — 73560 X-RAY EXAM OF KNEE 1 OR 2: CPT

## 2021-04-14 PROCEDURE — 99283 EMERGENCY DEPT VISIT LOW MDM: CPT

## 2021-04-14 PROCEDURE — 99284 EMERGENCY DEPT VISIT MOD MDM: CPT | Performed by: EMERGENCY MEDICINE

## 2021-04-14 PROCEDURE — 96372 THER/PROPH/DIAG INJ SC/IM: CPT

## 2021-04-14 RX ORDER — CYCLOBENZAPRINE HCL 10 MG
10 TABLET ORAL ONCE
Status: COMPLETED | OUTPATIENT
Start: 2021-04-14 | End: 2021-04-14

## 2021-04-14 RX ORDER — CYCLOBENZAPRINE HCL 10 MG
5 TABLET ORAL 2 TIMES DAILY PRN
Qty: 20 TABLET | Refills: 0 | Status: SHIPPED | OUTPATIENT
Start: 2021-04-14

## 2021-04-14 RX ORDER — KETOROLAC TROMETHAMINE 30 MG/ML
30 INJECTION, SOLUTION INTRAMUSCULAR; INTRAVENOUS ONCE
Status: COMPLETED | OUTPATIENT
Start: 2021-04-14 | End: 2021-04-14

## 2021-04-14 RX ORDER — ORPHENADRINE CITRATE 30 MG/ML
60 INJECTION INTRAMUSCULAR; INTRAVENOUS ONCE
Status: DISCONTINUED | OUTPATIENT
Start: 2021-04-14 | End: 2021-04-14

## 2021-04-14 RX ADMIN — KETOROLAC TROMETHAMINE 30 MG: 30 INJECTION, SOLUTION INTRAMUSCULAR at 04:36

## 2021-04-14 RX ADMIN — CYCLOBENZAPRINE HYDROCHLORIDE 10 MG: 10 TABLET, FILM COATED ORAL at 04:35

## 2021-04-14 NOTE — ED PROVIDER NOTES
History  Chief Complaint   Patient presents with    Leg Pain     Patient c/o right leg pain and spasming since 0130 this morning  Patient states pain from hip down to foot  Patient presents with 1 week of worsening right lower extremity pain  He states he was playing basketball last week  He denied any obvious injury or trauma  His pain is from his right hip to the right knee  He denies any recent fevers, chills, back pain, no urine or bowel incontinence  Prior to Admission Medications   Prescriptions Last Dose Informant Patient Reported? Taking? albuterol (PROVENTIL HFA,VENTOLIN HFA) 90 mcg/act inhaler Past Month at Unknown time  Yes Yes   Sig: Inhale 2 puffs every 6 (six) hours as needed for wheezing      Facility-Administered Medications: None       Past Medical History:   Diagnosis Date    Asthma        Past Surgical History:   Procedure Laterality Date    CHOLECYSTECTOMY      CHOLECYSTECTOMY LAPAROSCOPIC N/A 10/7/2016    Procedure: CHOLECYSTECTOMY LAPAROSCOPIC, POSS  OPEN;  Surgeon: Avelino Duque MD;  Location: [facility];  Service:     NO PAST SURGERIES         No family history on file  I have reviewed and agree with the history as documented  E-Cigarette/Vaping    E-Cigarette Use Never User      E-Cigarette/Vaping Substances     Social History     Tobacco Use    Smoking status: Former Smoker     Packs/day: 1 00     Years: 5 00     Pack years: 5 00     Types: Cigarettes, Cigars     Quit date: 10/5/2015     Years since quittin 5    Smokeless tobacco: Never Used   Substance Use Topics    Alcohol use: Yes     Alcohol/week: 1 0 standard drinks     Types: 1 Cans of beer per week     Comment: weekends    Drug use: No       Review of Systems   Constitutional: Negative  Negative for fever  HENT: Negative  Eyes: Negative  Respiratory: Negative  Negative for shortness of breath  Cardiovascular: Negative  Negative for palpitations  Gastrointestinal: Negative  Negative for abdominal pain and vomiting  Endocrine: Negative  Genitourinary: Negative  Musculoskeletal: Negative  Negative for back pain  Right lower extremity pain   Skin: Negative  Neurological: Negative  Negative for seizures, weakness and numbness  Hematological: Negative  Psychiatric/Behavioral: Negative  Negative for hallucinations and suicidal ideas  Physical Exam  Physical Exam  Vitals signs and nursing note reviewed  Constitutional:       Appearance: He is well-developed  HENT:      Head: Normocephalic and atraumatic  Eyes:      Pupils: Pupils are equal, round, and reactive to light  Neck:      Musculoskeletal: Normal range of motion and neck supple  Cardiovascular:      Rate and Rhythm: Normal rate and regular rhythm  Heart sounds: Normal heart sounds  Pulmonary:      Effort: Pulmonary effort is normal       Breath sounds: Normal breath sounds  Abdominal:      General: Bowel sounds are normal       Palpations: Abdomen is soft  Musculoskeletal:      Comments: Right posterior thigh tender on palpation, muscle spasm present  Right hip with normal full range of motion, right knee with normal full range of motion and without any deformity  Compartments are soft, neurovascularly intact distally   Skin:     General: Skin is warm and dry  Capillary Refill: Capillary refill takes less than 2 seconds  Neurological:      Mental Status: He is alert and oriented to person, place, and time           Vital Signs  ED Triage Vitals [04/14/21 0405]   Temperature Pulse Respirations Blood Pressure SpO2   99 7 °F (37 6 °C) (!) 106 18 163/97 96 %      Temp Source Heart Rate Source Patient Position - Orthostatic VS BP Location FiO2 (%)   Tympanic Monitor Sitting Right arm --      Pain Score       8           Vitals:    04/14/21 0405   BP: 163/97   Pulse: (!) 106   Patient Position - Orthostatic VS: Sitting         Visual Acuity      ED Medications  Medications ketorolac (TORADOL) injection 30 mg (30 mg Intramuscular Given 4/14/21 1316)   cyclobenzaprine (FLEXERIL) tablet 10 mg (10 mg Oral Given 4/14/21 7642)       Diagnostic Studies  Results Reviewed     None                 XR hip/pelv 1 vw RIGHT if performed    (Results Pending)   XR knee 1 or 2 views right    (Results Pending)              Procedures  Procedures         ED Course                                           MDM  Number of Diagnoses or Management Options  Right leg pain:   Diagnosis management comments: Patient's symptoms improved at this time       Amount and/or Complexity of Data Reviewed  Tests in the radiology section of CPT®: ordered and reviewed  Review and summarize past medical records: yes    Risk of Complications, Morbidity, and/or Mortality  Presenting problems: moderate  Diagnostic procedures: moderate  Management options: moderate    Patient Progress  Patient progress: stable      Disposition  Final diagnoses:   Right leg pain     Time reflects when diagnosis was documented in both MDM as applicable and the Disposition within this note     Time User Action Codes Description Comment    4/14/2021  4:54 AM Dean Yee [M79 604] Right leg pain       ED Disposition     ED Disposition Condition Date/Time Comment    Discharge Stable Wed Apr 14, 2021  4:54 AM Lisa Romeo discharge to home/self care              Follow-up Information     Follow up With Specialties Details Why Contact Info Additional Information    [facility]  In 1 week for pcp referral, As needed [address] [address] Emergency Department Emergency Medicine Go to  If symptoms worsen [address] [address] Emergency Department, [place], [place], [address]          Patient's Medications   Discharge Prescriptions    CYCLOBENZAPRINE (FLEXERIL) 10 MG TABLET    Take 0 5 tablets (5 mg total) by mouth 2 (two) times a day as needed for muscle spasms for up to 14 doses       Start Date: 4/14/2021 End Date: --       Order Dose: 5 mg       Quantity: 20 tablet    Refills: 0     No discharge procedures on file      PDMP Review     None          ED Provider  Electronically Signed by           Jarrod Sosa MD  04/14/21 8995

## 2021-04-14 NOTE — Clinical Note
Lisa Romeo was seen and treated in our emergency department on 4/14/2021  light duty    Diagnosis:     Lennie Copeland  may return to work on return date  He may return on this date: 04/15/2021         If you have any questions or concerns, please don't hesitate to call        Ji Vazquez RN    ______________________________           _______________          _______________  Hospital Representative                              Date                                Time

## 2021-04-20 ENCOUNTER — OFFICE VISIT (OUTPATIENT)
Dept: URGENT CARE | Facility: CLINIC | Age: 41
End: 2021-04-20
Payer: COMMERCIAL

## 2021-04-20 VITALS — HEART RATE: 110 BPM | OXYGEN SATURATION: 98 % | TEMPERATURE: 96.6 F | RESPIRATION RATE: 16 BRPM

## 2021-04-20 DIAGNOSIS — R05.9 COUGH: Primary | ICD-10-CM

## 2021-04-20 PROCEDURE — U0005 INFEC AGEN DETEC AMPLI PROBE: HCPCS | Performed by: PHYSICIAN ASSISTANT

## 2021-04-20 PROCEDURE — U0003 INFECTIOUS AGENT DETECTION BY NUCLEIC ACID (DNA OR RNA); SEVERE ACUTE RESPIRATORY SYNDROME CORONAVIRUS 2 (SARS-COV-2) (CORONAVIRUS DISEASE [COVID-19]), AMPLIFIED PROBE TECHNIQUE, MAKING USE OF HIGH THROUGHPUT TECHNOLOGIES AS DESCRIBED BY CMS-2020-01-R: HCPCS | Performed by: PHYSICIAN ASSISTANT

## 2021-04-20 PROCEDURE — 99213 OFFICE O/P EST LOW 20 MIN: CPT | Performed by: PHYSICIAN ASSISTANT

## 2021-04-20 NOTE — LETTER
[facility] Jacque Polanco  [address] [place] [id number]  Phone#  [phone]  Fax#  [phone]      April 20, 2021     Patient: Briseida Alvarez  YOB: 1980  Date of Last Encounter: Visit date not found    To whom it may concern:    Briseida Alvarez was tested for COVID-19 and needs to quarantine until his results are back  He may return to work pending negative test     He may attend work remotely during this timeframe        Sincerely,

## 2021-04-20 NOTE — PATIENT INSTRUCTIONS
Viral illness vs seasonal allergies  COVID swab done for symptoms and possible work exposure  mychart for results  Self isolation until results received   Recommend mucinex for cough  Rest, fluids and supportive care  May benefit from a cool mist humidifier on night stand  Tylenol/ibuprofen as needed for pain/fever  Follow up with PCP in 3-5 days  Proceed to  ER if symptoms worsen  [address]    Your healthcare provider and/or public health staff have evaluated you and have determined that you do not need to remain in the hospital at this time  At this time you can be isolated at home where you will be monitored by staff from your local or state health department  You should carefully follow the prevention and isolation steps below until a healthcare provider or local or state health department says that you can return to your normal activities  Stay home except to get medical care    People who are mildly ill with COVID-19 are able to isolate at home during their illness  You should restrict activities outside your home, except for getting medical care  Do not go to work, school, or public areas  Avoid using public transportation, ride-sharing, or taxis  Separate yourself from other people and animals in your home    People: As much as possible, you should stay in a specific room and away from other people in your home  Also, you should use a separate bathroom, if available  Animals: You should restrict contact with pets and other animals while you are sick with COVID-19, just like you would around other people  Although there have not been reports of pets or other animals becoming sick with COVID-19, it is still recommended that people sick with COVID-19 limit contact with animals until more information is known about the virus  When possible, have another member of your household care for your animals while you are sick   If you are sick with COVID-19, avoid contact with your pet, including petting, snuggling, being kissed or licked, and sharing food  If you must care for your pet or be around animals while you are sick, wash your hands before and after you interact with pets and wear a facemask  See COVID-19 and Animals for more information  Call ahead before visiting your doctor    If you have a medical appointment, call the healthcare provider and tell them that you have or may have COVID-19  This will help the healthcare providers office take steps to keep other people from getting infected or exposed  Wear a facemask    You should wear a facemask when you are around other people (e g , sharing a room or vehicle) or pets and before you enter a healthcare providers office  If you are not able to wear a facemask (for example, because it causes trouble breathing), then people who live with you should not stay in the same room with you, or they should wear a facemask if they enter your room  Cover your coughs and sneezes    Cover your mouth and nose with a tissue when you cough or sneeze  Throw used tissues in a lined trash can  Immediately wash your hands with soap and water for at least 20 seconds or, if soap and water are not available, clean your hands with an alcohol-based hand  that contains at least 60% alcohol  Clean your hands often    Wash your hands often with soap and water for at least 20 seconds, especially after blowing your nose, coughing, or sneezing; going to the bathroom; and before eating or preparing food  If soap and water are not readily available, use an alcohol-based hand  with at least 60% alcohol, covering all surfaces of your hands and rubbing them together until they feel dry  Soap and water are the best option if hands are visibly dirty  Avoid touching your eyes, nose, and mouth with unwashed hands      Avoid sharing personal household items    You should not share dishes, drinking glasses, cups, eating utensils, towels, or bedding with other people or pets in your home  After using these items, they should be washed thoroughly with soap and water  Clean all high-touch surfaces everyday    High touch surfaces include counters, tabletops, doorknobs, bathroom fixtures, toilets, phones, keyboards, tablets, and bedside tables  Also, clean any surfaces that may have blood, stool, or body fluids on them  Use a household cleaning spray or wipe, according to the label instructions  Labels contain instructions for safe and effective use of the cleaning product including precautions you should take when applying the product, such as wearing gloves and making sure you have good ventilation during use of the product  Monitor your symptoms    Seek prompt medical attention if your illness is worsening (e g , difficulty breathing)  Before seeking care, call your healthcare provider and tell them that you have, or are being evaluated for, COVID-19  Put on a facemask before you enter the facility  These steps will help the healthcare providers office to keep other people in the office or waiting room from getting infected or exposed  Ask your healthcare provider to call the local or Critical access hospital health department  Persons who are placed under active monitoring or facilitated self-monitoring should follow instructions provided by their local health department or occupational health professionals, as appropriate  If you have a medical emergency and need to call 911, notify the dispatch personnel that you have, or are being evaluated for COVID-19  If possible, put on a facemask before emergency medical services arrive      Discontinuing home isolation    Patients with confirmed COVID-19 should remain under home isolation precautions until the following conditions are met:   - They have had no fever for at least 24 hours (that is one full day of no fever without the use medicine that reduces fevers)  AND  - other symptoms have improved (for example, when their cough or shortness of breath have improved)  AND  - If had mild or moderate illness, at least 10 days have passed since their symptoms first appeared or if severe illness (needed oxygen) or immunosuppressed, at least 20 days have passed since symptoms first appeared  Patients with confirmed COVID-19 should also notify close contacts (including their workplace) and ask that they self-quarantine  Currently, close contact is defined as being within 6 feet for 15 minutes or more from the period 24 hours starting 48 hours before symptom onset to the time at which the patient went into isolation  Close contacts of patients diagnosed with COVID-19 should be instructed by the patient to self-quarantine for 14 days from the last time of their last contact with the patient       Source: [employer]

## 2021-04-20 NOTE — PROGRESS NOTES
[facility]    NAME: Liane Cummings is a 41 y o  male  : 1980    MRN: [id number]  DATE: 2021  TIME: 11:11 AM    Assessment and Plan   Cough [R05]  1  Cough  Novel Coronavirus (Covid-19),PCR [facility] - Office Collection     Patient Instructions   Viral illness vs seasonal allergies  COVID swab done for symptoms and possible work exposure  mychart for results  Self isolation until results received   Recommend mucinex for cough  Rest, fluids and supportive care  May benefit from a cool mist humidifier on night stand  Tylenol/ibuprofen as needed for pain/fever  Follow up with PCP in 3-5 days  Proceed to  ER if symptoms worsen  Chief Complaint     Chief Complaint   Patient presents with    COVID-19     pt presents with cough, head congestion, headache, fever, possible exposure; started Thursday         History of Present Illness       Dairus Edouard is a 24-year-old male who presents to clinic complaining of fever x6 days  He states the T-max of fever was 99 7° F and since then has been intermittent  He also is complaining of chills, fatigue, nasal congestion and cough  He describes the cough is dry and nonproductive  He does have a history of asthma induced by allergy but has not been taking any medication for that  He denies any shortness of breath, myalgias, nausea, vomiting, ear pain, sore throat, loss of taste or smell, recent travel, or exposure to anyone known COVID +  Review of Systems   Review of Systems   Constitutional: Positive for chills, fatigue and fever  HENT: Positive for congestion  Negative for ear pain, sinus pressure, sinus pain and sore throat  Respiratory: Positive for cough  Negative for shortness of breath  Gastrointestinal: Negative for diarrhea, nausea and vomiting  Musculoskeletal: Negative for myalgias  Neurological: Negative for headaches       Current Medications       Current Outpatient Medications:     albuterol (PROVENTIL HFA,VENTOLIN HFA) 90 mcg/act inhaler, Inhale 2 puffs every 6 (six) hours as needed for wheezing, Disp: , Rfl:     cyclobenzaprine (FLEXERIL) 10 mg tablet, Take 0 5 tablets (5 mg total) by mouth 2 (two) times a day as needed for muscle spasms for up to 14 doses, Disp: 20 tablet, Rfl: 0    Current Allergies     Allergies as of 04/20/2021 - Reviewed 04/20/2021   Allergen Reaction Noted    Other  10/05/2016    Morphine and related Rash 10/05/2016            The following portions of the patient's history were reviewed and updated as appropriate: allergies, current medications, past family history, past medical history, past social history, past surgical history and problem list      Past Medical History:   Diagnosis Date    Asthma        Past Surgical History:   Procedure Laterality Date    CHOLECYSTECTOMY      CHOLECYSTECTOMY LAPAROSCOPIC N/A 10/7/2016    Procedure: CHOLECYSTECTOMY LAPAROSCOPIC, POSS  OPEN;  Surgeon: Barbara Vallejo MD;  Location: [address] [place] [address];  Service:     NO PAST SURGERIES         History reviewed  No pertinent family history  Medications have been verified  Objective   Pulse (!) 110   Temp (!) 96 6 °F (35 9 °C)   Resp 16   SpO2 98%   No LMP for male patient  Physical Exam     Physical Exam  Vitals signs and nursing note reviewed  Constitutional:       General: He is not in acute distress  Appearance: Normal appearance  He is not ill-appearing  HENT:      Right Ear: Tympanic membrane, ear canal and external ear normal       Left Ear: Tympanic membrane, ear canal and external ear normal       Nose: Congestion present  Mouth/Throat:      Mouth: Mucous membranes are moist       Pharynx: No oropharyngeal exudate or posterior oropharyngeal erythema  Cardiovascular:      Rate and Rhythm: Normal rate and regular rhythm  Heart sounds: Normal heart sounds  Pulmonary:      Effort: Pulmonary effort is normal  No respiratory distress  Breath sounds: Normal breath sounds   No stridor  No wheezing, rhonchi or rales  Lymphadenopathy:      Cervical: No cervical adenopathy  Neurological:      Mental Status: He is alert and oriented to person, place, and time     Psychiatric:         Mood and Affect: Mood normal          Behavior: Behavior normal

## 2021-04-21 LAB — SARS-COV-2 RNA RESP QL NAA+PROBE: POSITIVE

## 2021-04-26 ENCOUNTER — TELEPHONE (OUTPATIENT)
Dept: URGENT CARE | Facility: CLINIC | Age: 41
End: 2021-04-26

## 2022-03-07 ENCOUNTER — HOSPITAL ENCOUNTER (EMERGENCY)
Facility: HOSPITAL | Age: 42
Discharge: HOME | End: 2022-03-07
Attending: EMERGENCY MEDICINE | Admitting: EMERGENCY MEDICINE

## 2022-03-07 VITALS
BODY MASS INDEX: 29.88 KG/M2 | WEIGHT: 252 LBS | RESPIRATION RATE: 20 BRPM | OXYGEN SATURATION: 99 % | TEMPERATURE: 97.9 F | HEART RATE: 109 BPM | SYSTOLIC BLOOD PRESSURE: 162 MMHG | DIASTOLIC BLOOD PRESSURE: 108 MMHG

## 2022-03-07 DIAGNOSIS — M54.50 LEFT LOW BACK PAIN: Primary | ICD-10-CM

## 2022-03-07 PROCEDURE — 99284 EMERGENCY DEPT VISIT MOD MDM: CPT | Performed by: EMERGENCY MEDICINE

## 2022-03-07 PROCEDURE — 99283 EMERGENCY DEPT VISIT LOW MDM: CPT

## 2022-03-07 RX ORDER — NAPROXEN 500 MG/1
500 TABLET ORAL 2 TIMES DAILY WITH MEALS
Qty: 14 TABLET | Refills: 0 | Status: SHIPPED | OUTPATIENT
Start: 2022-03-07 | End: 2022-03-14

## 2022-03-07 RX ORDER — DIAZEPAM 5 MG/1
5 TABLET ORAL ONCE
Status: COMPLETED | OUTPATIENT
Start: 2022-03-07 | End: 2022-03-07

## 2022-03-07 RX ORDER — NAPROXEN 500 MG/1
500 TABLET ORAL ONCE
Status: COMPLETED | OUTPATIENT
Start: 2022-03-07 | End: 2022-03-07

## 2022-03-07 RX ORDER — ACETAMINOPHEN 325 MG/1
975 TABLET ORAL ONCE
Status: COMPLETED | OUTPATIENT
Start: 2022-03-07 | End: 2022-03-07

## 2022-03-07 RX ORDER — LIDOCAINE 50 MG/G
1 PATCH TOPICAL ONCE
Status: DISCONTINUED | OUTPATIENT
Start: 2022-03-07 | End: 2022-03-07 | Stop reason: HOSPADM

## 2022-03-07 RX ORDER — LIDOCAINE 50 MG/G
1 PATCH TOPICAL DAILY
Qty: 6 PATCH | Refills: 0 | Status: SHIPPED | OUTPATIENT
Start: 2022-03-07

## 2022-03-07 RX ADMIN — ACETAMINOPHEN 975 MG: 325 TABLET, FILM COATED ORAL at 01:42

## 2022-03-07 RX ADMIN — DIAZEPAM 5 MG: 5 TABLET ORAL at 01:43

## 2022-03-07 RX ADMIN — NAPROXEN 500 MG: 500 TABLET ORAL at 01:43

## 2022-03-07 RX ADMIN — LIDOCAINE 5% 1 PATCH: 700 PATCH TOPICAL at 01:42

## 2022-03-07 NOTE — ED PROVIDER NOTES
Final Diagnosis:  1  Left low back pain        Chief Complaint   Patient presents with    Back Pain     C/o of lower L sided back pain since yesterday  No relief with muscle relaxer+icey/hot  HPI  Patient got off work yesterday at [employer]  Started yesterday around [facility] and has worsened despite flexeril (for prior leg spasms) icey hot  No fever, no prior IV drug use, no urianry/bowel incontinence, no saddle anesthesia  Walks to room  No weakness in one leg not other etc  No focal neuro  No h/o kidney stones  Drinks lots of water  Feels like a knot in his back  No urinary change/blood  No radiation forward to groin etc      - No language barrier    - History obtained from patient  - There are no limitations to the history obtained  - Previous charting underwent limited review with attention to last ED visits, labs, ekgs, and prior imaging  PMH:   has a past medical history of Asthma  PSH:   has a past surgical history that includes No past surgeries; CHOLECYSTECTOMY LAPAROSCOPIC (N/A, 10/7/2016); and Cholecystectomy  Social History:  Minimal lifting at work    ROS:    Pertinent positives/negatives:   Review of Systems   Genitourinary: Positive for flank pain  Negative for decreased urine volume, difficulty urinating, dysuria, frequency, hematuria and urgency  Musculoskeletal: Positive for back pain  Negative for arthralgias, gait problem, joint swelling, neck pain and neck stiffness  CONSTITUTIONAL:  No dizziness  No weakness  No unexpected weight loss  EYES:  No pain, erythema, or discharge  No loss of vision  ENT:  No tinnitus, decreased hearing  No epistaxis/purulent drainage  No voice change, airway closing, trismus  CARDIOVASCULAR:  No chest pain  No palpitations  No new lower extremity edema  RESPIRATORY:  No purulent cough  No hemoptysis  No dyspnea  No paroxysmal nocturnal dyspnea  No stridor, audible wheezing bedside  GASTROINTESTINAL:  Normal appetite   No vomiting, diarrhea  No pain  No bloating  No melena  GENITOURINARY:  No frequency, urgency, nocturia  No hematuria or dysuria  No discharge  No sores/adenopathy  MUSCULOSKELETAL:  No arthralgias or myalgias that are new  INTEGUMENTARY:  No swelling  No unexpected contusions  No abrasions  No lymphangitis  NEUROLOGIC:  No meningismus  No numbness of the extremities  No new focal weakness  No postural instability  PSYCHIATRIC:  No SI HI AVH  HEMATOLOGICAL:  No bleeding  No petechiae  No bruising  ALLERGIES:  No urticaria  No sudden abd cramping  No stridor  PE:     Physical exam highlights:   Physical Exam       Vitals:    03/07/22 0122   BP: (!) 162/108   BP Location: Left arm   Pulse: (!) 109   Resp: 20   Temp: 97 9 °F (36 6 °C)   TempSrc: Tympanic   SpO2: 99%   Weight: 114 kg (252 lb)     Vitals reviewed by me  Nursing note reviewed  Chaperone present for all sensitive exam   Const: No acute distress  Alert  Nontoxic  Not diaphoretic  HEENT: External ears normal  No protrusion drainage swelling  Nose normal  No drainage/traumatic deformity  MMM  Mouth with baseline/symmetric movement  No trismus  Eyes: No squinting  No icterus  Tracks through the room with normal EOM  No tearing/swelling/drainage  Neck: ROM normal  No rigidity  No meningismus  Cards: Rate as per vitals  Compared to monitor sinus unless documented above  Regular  Well perfused  Pulm: able to verbalize without additional effort  Effort and excursion normal  No disress  No audible wheezing/ stridor  Normal resp rate  Abd: No distension beyond baseline  No fluctuant wave  Patient without peritoneal pain with shifting/bumping the bed  MSK: ROM normal and baseline  No deformity  Skin: No new rashes visible  Well perfused  Neuro: Nonfocal  Baseline  CN grossly intact  Moving all four with coordination  Psych: Normal behavior and affect  A:  - Nursing note reviewed      Ddx and MDM  Offer to check urine, patient declines    Treat symptoms    Light duty work    Physical therapy    mmuscle relaxer, naproxen, patches                      No orders to display     No orders of the defined types were placed in this encounter  Labs Reviewed - No data to display    Final Diagnosis:  1  Left low back pain        P:  - hospital tx includes   Medications   diazepam (VALIUM) tablet 5 mg (5 mg Oral Given 3/7/22 0143)   naproxen (NAPROSYN) tablet 500 mg (500 mg Oral Given 3/7/22 0143)   acetaminophen (TYLENOL) tablet 975 mg (975 mg Oral Given 3/7/22 0142)         - disposition  Time reflects when diagnosis was documented in both MDM as applicable and the Disposition within this note     Time User Action Codes Description Comment    3/7/2022  1:34 AM Cassandra Martin Add [M54 50] Left low back pain       ED Disposition     ED Disposition Condition Date/Time Comment    Discharge Stable Mon Mar 7, 2022  1:34 AM Yu Reyna discharge to home/self care  Follow-up Information     Follow up With Specialties Details Why Contact Info Additional [address] [facility]   [phone]          - patient will call their PCP to let them know they were in the emergency department   We discuss return precautions       - additional tx intended, if consistent with primary provider:  - patient to follow with :      Discharge Medication List as of 3/7/2022  1:35 AM      START taking these medications    Details   lidocaine (Lidoderm) 5 % Apply 1 patch topically daily Remove & Discard patch within 12 hours or as directed by MD, Starting Mon 3/7/2022, Normal      naproxen (Naprosyn) 500 mg tablet Take 1 tablet (500 mg total) by mouth 2 (two) times a day with meals for 7 days, Starting Mon 3/7/2022, Until Mon 3/14/2022, Normal         CONTINUE these medications which have NOT CHANGED    Details   albuterol (PROVENTIL HFA,VENTOLIN HFA) 90 mcg/act inhaler Inhale 2 puffs every 6 (six) hours as needed for wheezing, Until Discontinued, Historical Med      cyclobenzaprine (FLEXERIL) 10 mg tablet Take 0 5 tablets (5 mg total) by mouth 2 (two) times a day as needed for muscle spasms for up to 14 doses, Starting Wed 4/14/2021, Normal           No discharge procedures on file  Prior to Admission Medications   Prescriptions Last Dose Informant Patient Reported? Taking? albuterol (PROVENTIL HFA,VENTOLIN HFA) 90 mcg/act inhaler Past Month at Unknown time  Yes Yes   Sig: Inhale 2 puffs every 6 (six) hours as needed for wheezing   cyclobenzaprine (FLEXERIL) 10 mg tablet 3/7/2022 at Unknown time  No Yes   Sig: Take 0 5 tablets (5 mg total) by mouth 2 (two) times a day as needed for muscle spasms for up to 14 doses      Facility-Administered Medications: None       Portions of the record may have been created with voice recognition software  Occasional wrong word or "sound a like" substitutions may have occurred due to the inherent limitations of voice recognition software  Read the chart carefully and recognize, using context, where substitutions have occurred      Electronically signed by:  MD Kaylyn Taylor MD  03/07/22 [id number]

## 2022-03-07 NOTE — Clinical Note
Tabitha Corley was seen and treated in our emergency department on 3/7/2022  Diagnosis:     Miranda Ring  may return to work on return date  He may return on this date: 03/08/2022    Light duty for 2-3 days     If you have any questions or concerns, please don't hesitate to call        Vickie Bumpers, MD    ______________________________           _______________          _______________  Hospital Representative                              Date                                Time

## 2022-09-16 ENCOUNTER — OFFICE VISIT (OUTPATIENT)
Dept: URGENT CARE | Facility: CLINIC | Age: 42
End: 2022-09-16
Payer: COMMERCIAL

## 2022-09-16 VITALS
RESPIRATION RATE: 18 BRPM | HEIGHT: 77 IN | BODY MASS INDEX: 29.76 KG/M2 | WEIGHT: 252 LBS | DIASTOLIC BLOOD PRESSURE: 73 MMHG | HEART RATE: 117 BPM | OXYGEN SATURATION: 99 % | TEMPERATURE: 97.9 F | SYSTOLIC BLOOD PRESSURE: 145 MMHG

## 2022-09-16 DIAGNOSIS — J45.21 MILD INTERMITTENT ASTHMA WITH ACUTE EXACERBATION: Primary | ICD-10-CM

## 2022-09-16 PROCEDURE — 99213 OFFICE O/P EST LOW 20 MIN: CPT | Performed by: PHYSICIAN ASSISTANT

## 2022-09-16 RX ORDER — PREDNISONE 50 MG/1
50 TABLET ORAL DAILY
Qty: 5 TABLET | Refills: 0 | Status: SHIPPED | OUTPATIENT
Start: 2022-09-16 | End: 2022-09-21

## 2022-09-16 RX ORDER — ALBUTEROL SULFATE 2.5 MG/3ML
2.5 SOLUTION RESPIRATORY (INHALATION) EVERY 6 HOURS PRN
Qty: 3 ML | Refills: 0 | Status: SHIPPED | OUTPATIENT
Start: 2022-09-16 | End: 2022-10-16

## 2022-09-16 RX ORDER — ALBUTEROL SULFATE 90 UG/1
2 AEROSOL, METERED RESPIRATORY (INHALATION) EVERY 6 HOURS PRN
Qty: 6.7 G | Refills: 0 | Status: SHIPPED | OUTPATIENT
Start: 2022-09-16 | End: 2022-10-28 | Stop reason: ALTCHOICE

## 2022-09-16 NOTE — LETTER
September 16, 2022     Patient: Betzy Waller   YOB: 1980   Date of Visit: 9/16/2022       To Whom It May Concern: It is my medical opinion that Betzy Waller needs 9/16/22 excused from work  If you have any questions or concerns, please don't hesitate to call           Sincerely,        Magali Parish PA-C    CC: No Recipients

## 2022-09-16 NOTE — PROGRESS NOTES
[facility]        NAME: Giovani Muhammad is a 35 y o  male  : 1980    MRN: [id number]  DATE: 2022  TIME: 8:01 PM    Assessment and Plan   Mild intermittent asthma with acute exacerbation [J45 21]  1  Mild intermittent asthma with acute exacerbation  predniSONE 50 mg tablet    albuterol (Proventil HFA) 90 mcg/act inhaler    albuterol (2 5 mg/3 mL) 0 083 % nebulizer solution         Patient Instructions   Acute asthma exacerbation:   -Pulse ox is 99%, there is mild wheezing bilaterally    -Will prescribe Prednisone 50mg PO QD x 5 days  Take with meals    -Will refill his albuterol inhaler and nebulizer solution to be used only as discussed and prescribed   -Fluticasone Nasal Spray for PND and congestion  -You can use OTC zyrtec or claritin    -Use a humidifier next to your bed  Take steam showers  -Stay very well hydrated and rest   -If your symptoms persist or worsen follow up immediately with your PCP  If you experience acutely worsening symptoms go to the ED immediately  Red flag signs discussed  Follow up with PCP in 3-5 days  Proceed to  ER if symptoms worsen  Chief Complaint     Chief Complaint   Patient presents with    Asthma     Worsening asthma s/s  History of Present Illness       The patient is a 47-year-old male who presents with his wife for concerns of asthma exacerbation  He states that he has been experiencing one month of mild wheezing, chest tightness and dyspnea on exertion with dry cough  He states that when the seasons change he has exacerbations  He states that he has post nasal drainage  He states that his inhaler has not been refilled in one year  No fever, chills, body aches  No COVID test  He has declined a test today  No sick contacts or recent travel  He is vaccinated for COVID  No dizziness or weakness  No cp or palpitations         Review of Systems   Review of Systems   Constitutional: Negative for activity change, appetite change, chills, diaphoresis, fatigue and fever  HENT: Negative for congestion, ear discharge, ear pain, facial swelling, hearing loss, postnasal drip, rhinorrhea, sinus pressure, sinus pain, sore throat, tinnitus, trouble swallowing and voice change  Eyes: Negative for visual disturbance  Respiratory: Positive for chest tightness, shortness of breath and wheezing  Negative for apnea, cough and stridor  Cardiovascular: Negative for chest pain, palpitations and leg swelling  Gastrointestinal: Negative for abdominal distention and abdominal pain  Genitourinary: Negative for decreased urine volume  Musculoskeletal: Negative for arthralgias, joint swelling, myalgias, neck pain and neck stiffness  Skin: Negative for rash  Allergic/Immunologic: Negative for immunocompromised state  Neurological: Negative for dizziness, weakness, light-headedness, numbness and headaches  Hematological: Negative for adenopathy           Current Medications       Current Outpatient Medications:     albuterol (2 5 mg/3 mL) 0 083 % nebulizer solution, Take 3 mL (2 5 mg total) by nebulization every 6 (six) hours as needed for wheezing or shortness of breath, Disp: 3 mL, Rfl: 0    albuterol (Proventil HFA) 90 mcg/act inhaler, Inhale 2 puffs every 6 (six) hours as needed for wheezing or shortness of breath, Disp: 6 7 g, Rfl: 0    predniSONE 50 mg tablet, Take 1 tablet (50 mg total) by mouth daily for 5 days, Disp: 5 tablet, Rfl: 0    albuterol (PROVENTIL HFA,VENTOLIN HFA) 90 mcg/act inhaler, Inhale 2 puffs every 6 (six) hours as needed for wheezing, Disp: , Rfl:     cyclobenzaprine (FLEXERIL) 10 mg tablet, Take 0 5 tablets (5 mg total) by mouth 2 (two) times a day as needed for muscle spasms for up to 14 doses, Disp: 20 tablet, Rfl: 0    lidocaine (Lidoderm) 5 %, Apply 1 patch topically daily Remove & Discard patch within 12 hours or as directed by MD, Disp: 6 patch, Rfl: 0    naproxen (Naprosyn) 500 mg tablet, Take 1 tablet (500 mg total) by mouth 2 (two) times a day with meals for 7 days, Disp: 14 tablet, Rfl: 0    Current Allergies     Allergies as of 09/16/2022 - Reviewed 09/16/2022   Allergen Reaction Noted    Other  10/05/2016    Morphine and related Rash 10/05/2016            The following portions of the patient's history were reviewed and updated as appropriate: allergies, current medications, past family history, past medical history, past social history, past surgical history and problem list      Past Medical History:   Diagnosis Date    Asthma        Past Surgical History:   Procedure Laterality Date    CHOLECYSTECTOMY      CHOLECYSTECTOMY LAPAROSCOPIC N/A 10/7/2016    Procedure: CHOLECYSTECTOMY LAPAROSCOPIC, POSS  OPEN;  Surgeon: Adilia Rosales MD;  Location: [address] [place] [address];  Service:     NO PAST SURGERIES         History reviewed  No pertinent family history  Medications have been verified  Objective   /73   Pulse (!) 117   Temp 97 9 °F (36 6 °C)   Resp 18   Ht 6' 5" (1 956 m)   Wt 114 kg (252 lb)   SpO2 99%   BMI 29 88 kg/m²   No LMP for male patient  Physical Exam     Physical Exam  Vitals and nursing note reviewed  Constitutional:       General: He is not in acute distress  Appearance: He is well-developed  He is not diaphoretic  HENT:      Head: Normocephalic and atraumatic  Right Ear: Hearing, tympanic membrane, ear canal and external ear normal       Left Ear: Hearing, tympanic membrane, ear canal and external ear normal       Nose: Nose normal  No mucosal edema or rhinorrhea  Right Sinus: No maxillary sinus tenderness or frontal sinus tenderness  Left Sinus: No maxillary sinus tenderness or frontal sinus tenderness  Mouth/Throat:      Pharynx: Uvula midline  No oropharyngeal exudate, posterior oropharyngeal erythema or uvula swelling  Tonsils: No tonsillar abscesses  Cardiovascular:      Rate and Rhythm: Normal rate and regular rhythm        Heart sounds: S1 normal and S2 normal  Heart sounds not distant  No murmur heard  No friction rub  No gallop  No S3 or S4 sounds  Pulmonary:      Effort: No tachypnea, bradypnea, accessory muscle usage or respiratory distress  Breath sounds: Normal air entry  No stridor, decreased air movement or transmitted upper airway sounds  Examination of the right-middle field reveals wheezing  Examination of the left-middle field reveals wheezing  Examination of the right-lower field reveals wheezing  Examination of the left-lower field reveals wheezing  Wheezing present  No decreased breath sounds, rhonchi or rales  Comments: Mild wheezing heard bilaterally on exam   Musculoskeletal:      Cervical back: Normal range of motion and neck supple  Lymphadenopathy:      Cervical: No cervical adenopathy  Neurological:      Mental Status: He is alert and oriented to person, place, and time     Psychiatric:         Behavior: Behavior normal

## 2022-09-17 NOTE — PATIENT INSTRUCTIONS
Asthma   WHAT YOU NEED TO KNOW:   Asthma is a lung disease that makes breathing difficult  Chronic inflammation and reactions to triggers narrow the airways in the lungs  Asthma can become life-threatening if it is not managed  DISCHARGE INSTRUCTIONS:   Call your local emergency number (911 in the [address] [facility] [facility]) if:   You have severe shortness of breath  The skin around your neck and ribs pulls in with each breath  Your peak flow numbers are in the red zone of your AAP  Return to the emergency department if:   You have shortness of breath, even after you take your short-term medicine as directed  Your lips or nails turn blue or gray  Call your doctor or asthma specialist if:   You run out of medicine before your next refill is due  Your symptoms get worse  You need to take more medicine than usual to control your symptoms  You have questions or concerns about your condition or care  Medicines:   Medicines  may be used to decrease inflammation, open airways, and make it easier to breathe  Medicines may be inhaled, taken as a pill, or injected  Short-term medicines relieve your symptoms quickly  Long-term medicines are used to prevent future asthma attacks  Other medicines may be needed if your regular medicines are not able to prevent attacks  You may also need medicine to help control your allergies  Ask your healthcare provider for more information about any medicine you are given and how to take it safely  Take your medicine as directed  Contact your healthcare provider if you think your medicine is not helping or if you have side effects  Tell him of her if you are allergic to any medicine  Keep a list of the medicines, vitamins, and herbs you take  Include the amounts, and when and why you take them  Bring the list or the pill bottles to follow-up visits  Carry your medicine list with you in case of an emergency  Manage your symptoms and prevent future attacks:    Follow your Asthma Action Plan (AAP)  This is a written plan that you and your healthcare provider create  It explains which medicine you need and when to change doses if necessary  It also explains how you can monitor symptoms and use a peak flow meter  The meter measures how well your lungs are working  Manage other health conditions , such as allergies, acid reflux, and sleep apnea  Identify and avoid triggers  These may include pets, dust mites, mold, and cockroaches  Do not smoke or be around others who smoke  Nicotine and other chemicals in cigarettes and cigars can cause lung damage  Ask your healthcare provider for information if you currently smoke and need help to quit  E-cigarettes or smokeless tobacco still contain nicotine  Talk to your healthcare provider before you use these products  Ask about the flu vaccine  The flu can make your asthma worse  You may need a yearly flu shot  Follow up with your healthcare provider as directed: You will need to return to make sure your medicine is working and your symptoms are controlled  You may be referred to an asthma or allergy specialist  Jose G Garciauffer may be asked to keep a record of your peak flow values and bring it with you to your appointments  Write down your questions so you remember to ask them during your visits  © Copyright [employer] 2022 Information is for End User's use only and may not be sold, redistributed or otherwise used for commercial purposes  All illustrations and images included in CareNotes® are the copyrighted property of [employer]  or Diony Gonzalez   The above information is an  only  It is not intended as medical advice for individual conditions or treatments  Talk to your doctor, nurse or pharmacist before following any medical regimen to see if it is safe and effective for you  Acute asthma exacerbation:   -Pulse ox is 99%, there is mild wheezing bilaterally    -Will prescribe Prednisone 50mg PO QD x 5 days  Take with meals    -Will refill his albuterol inhaler and nebulizer solution to be used only as discussed and prescribed   -Fluticasone Nasal Spray for PND and congestion  -You can use OTC zyrtec or claritin    -Use a humidifier next to your bed  Take steam showers  -Stay very well hydrated and rest   -If your symptoms persist or worsen follow up immediately with your PCP  If you experience acutely worsening symptoms go to the ED immediately  Red flag signs discussed

## 2022-10-28 ENCOUNTER — OFFICE VISIT (OUTPATIENT)
Dept: FAMILY MEDICINE CLINIC | Facility: CLINIC | Age: 42
End: 2022-10-28
Payer: COMMERCIAL

## 2022-10-28 VITALS
WEIGHT: 259.2 LBS | SYSTOLIC BLOOD PRESSURE: 142 MMHG | OXYGEN SATURATION: 97 % | RESPIRATION RATE: 18 BRPM | BODY MASS INDEX: 30.6 KG/M2 | HEIGHT: 77 IN | DIASTOLIC BLOOD PRESSURE: 77 MMHG | HEART RATE: 113 BPM | TEMPERATURE: 98.4 F

## 2022-10-28 DIAGNOSIS — J45.40 MODERATE PERSISTENT ASTHMA, UNSPECIFIED WHETHER COMPLICATED: ICD-10-CM

## 2022-10-28 DIAGNOSIS — Z11.59 NEED FOR HEPATITIS C SCREENING TEST: ICD-10-CM

## 2022-10-28 DIAGNOSIS — K21.9 GASTROESOPHAGEAL REFLUX DISEASE WITHOUT ESOPHAGITIS: ICD-10-CM

## 2022-10-28 DIAGNOSIS — D17.24 LIPOMA OF LEFT LOWER EXTREMITY: ICD-10-CM

## 2022-10-28 DIAGNOSIS — Z12.5 PROSTATE CANCER SCREENING: ICD-10-CM

## 2022-10-28 DIAGNOSIS — Z00.00 ANNUAL PHYSICAL EXAM: Primary | ICD-10-CM

## 2022-10-28 DIAGNOSIS — Z11.4 SCREENING FOR HIV (HUMAN IMMUNODEFICIENCY VIRUS): ICD-10-CM

## 2022-10-28 PROCEDURE — 99386 PREV VISIT NEW AGE 40-64: CPT | Performed by: FAMILY MEDICINE

## 2022-10-28 RX ORDER — FLUTICASONE PROPIONATE AND SALMETEROL XINAFOATE 115; 21 UG/1; UG/1
2 AEROSOL, METERED RESPIRATORY (INHALATION) 2 TIMES DAILY
Qty: 12 G | Refills: 1 | Status: SHIPPED | OUTPATIENT
Start: 2022-10-28

## 2022-10-28 RX ORDER — ALBUTEROL SULFATE 90 UG/1
2 AEROSOL, METERED RESPIRATORY (INHALATION) EVERY 6 HOURS PRN
Qty: 6.7 G | Refills: 0 | Status: SHIPPED | OUTPATIENT
Start: 2022-10-28

## 2022-10-28 RX ORDER — ALBUTEROL SULFATE 2.5 MG/3ML
SOLUTION RESPIRATORY (INHALATION) EVERY 6 HOURS PRN
COMMUNITY
End: 2022-10-28 | Stop reason: ALTCHOICE

## 2022-10-28 NOTE — PATIENT INSTRUCTIONS
GERD (Gastroesophageal Reflux Disease) is caused by reflux of stomach acid into the esophagus (food tube)  It is a common condition and often occurs with general indigestion  Common causes include the Western lifestyle, especially the following:   Eating a Western diet full of processed high calorie to nutrient ratio food  About 80% of adults have an inadequate diet, so this is a foundational change required for most patients suffering with GERD  Eating quickly in stressful environments ('gobble, gulp & go')  Obesity  High stress, poor posture  Certain medicines and substances can aggravate GERD      How to treat GERD: Most GERD symptoms such as heartburn, regurgitation, and hoarseness can be adequately treated with the following:    Healthy eating:  Eat a plant based, whole foods diet  Avoid processed food, including any food product made from typical commercial flour products (e g  Commercial breads, pastries)  Take a probiotic containing food daily, e g  Kefir, yogurt    If overweight, losing weight often improves symptoms    Avoid any food that aggravates your symptoms  These commonly include: coffee, smoking, alcohol, dairy, citrus (e g  orange juice), tomatoes, spicy foods or mint    Elevate head of the bed at night about 6 inches by putting blocks under wear the bed posts at the head of the bed meet the floor  Avoid eating within 2 hours of bedtime    Eat slowly and thoroughly chew your food - saliva neutralizes the effects of stomach acid on irritating the esophagus  Chewing gum after a meal enhances saliva production  Consider a trial of the elimination diet if you suspect a food allergy/intolerance        Musculoskeletal aspects of care:  Good posture is important to decrease tendency to reflux  Don't hunch over  Breath in a relaxed manner deep in the belly, rather than high in the chest  Somatics materials: [email or web address]  [email or web address] html: Somatic Exercises[TM] narrated by Abdirashid Bermudez, Tanner D : Somatic Exercises[TM ]for Healthy Breathing (CDs)    Avoid tight binders around the abdomen (e g  Corsets in women), which will increase abdominal pressure and therefore reflux  Sleep on your left side (this prevents stomach contents from refluxing into the esophagus)    Re-train the muscles involved in swallowing as well as strengthen the diaphragm - key in reducing any hiatal hernia  Abnormal rhythm (peristalsis) of the muscles in the food tube occur in about 1/2 of GERD patients  Can use a commercial device such as IQoro (IQoro com)  Expect it to take several weeks or months for improvement  This may also help snoring and obstructive sleep apnea  The device does have some scientific evidence supporting its use, but all by one researcher with commercial connection to the product  It is fairly expensive, and it is unlikely that insurance companies will cover it  Supplements     Consider Slippery elm after a meal - can get tabs (e g  Reece's slippery elm lozenges) or powder - mix with a little water to make a gruel (can add cinnamon for taste) and take after meals     Consider Marshmallow - use similarly to slippery elm    Melatonin, e g  3 gram tab daily during flares may help heal GERD related lesions  May cause drowsiness    Conventional Medicines  Alginate/antacid combos can keep acid away from the lower esophagus  A good brand is Gaviscon Double Action liquid, taken after meals  Simple over the counter (OTC) antacids such as Calcium Carbonate (e g  Tums) may suffice    H2 blockers decrease acid in the stomach (e g  Ranitidine - Zantac, Famotidine - Pepcid, and others)    Proton Pump blockers (PPIs) are stronger acid-suppressors than H2 blockers  Examples are Omeprazole, Pantoprazole, Esomeprazole - Nexium, Rabeprazole - Aciphex and others)   They may be needed for a short time to get control of symptoms and are approved for up to 8 weeks for GERD (up to 16 weeks or even longer for more severe symptoms)  Because of numerous concerns about possible long term side effects, such as magnesium deficiency, causing or worsening various kidney diseases including potentially worsening kidney failure, increase of some fractures, pneumonia and opportunistic GI infections such as Clostridia difficile, the risk/benefit ratio of long term use should be carefully evaluated  If needed, your health care provider can guide you how to taper off these medicines slowly (do not stop abruptly, because you might get rebound symptoms)  Wellness Visit for Adults   AMBULATORY CARE:   A wellness visit  is when you see your healthcare provider to get screened for health problems  Your healthcare provider will also give you advice on how to stay healthy  Write down your questions so you remember to ask them  Ask your healthcare provider how often you should have a wellness visit  What happens at a wellness visit:  Your healthcare provider will ask about your health, and your family history of health problems  This includes high blood pressure, heart disease, and cancer  He or she will ask if you have symptoms that concern you, if you smoke, and about your mood  You may also be asked about your intake of medicines, supplements, food, and alcohol  Any of the following may be done: Your weight  will be checked  Your height may also be checked so your body mass index (BMI) can be calculated  Your BMI shows if you are at a healthy weight  Your blood pressure  and heart rate will be checked  Your temperature may also be checked  Blood and urine tests  may be done  Blood tests may be done to check your cholesterol levels  Abnormal cholesterol levels increase your risk for heart disease and stroke  You may also need a blood or urine test to check for diabetes if you are at increased risk  Urine tests may be done to look for signs of an infection or kidney disease      A physical exam  includes checking your heartbeat and lungs with a stethoscope  Your healthcare provider may also check your skin to look for sun damage  Screening tests  may be recommended  A screening test is done to check for diseases that may not cause symptoms  The screening tests you may need depend on your age, gender, family history, and lifestyle habits  For example, colorectal screening may be recommended if you are 48years old or older  Screening tests you need if you are a woman:   A Pap smear  is used to screen for cervical cancer  Pap smears are usually done every 3 to 5 years depending on your age  You may need them more often if you have had abnormal Pap smear test results in the past  Ask your healthcare provider how often you should have a Pap smear  A mammogram  is an x-ray of your breasts to screen for breast cancer  Experts recommend mammograms every 2 years starting at age 48 years  You may need a mammogram at age 52 years or younger if you have an increased risk for breast cancer  Talk to your healthcare provider about when you should start having mammograms and how often you need them  Vaccines you may need:   Get an influenza vaccine  every year  The influenza vaccine protects you from the flu  Several types of viruses cause the flu  The viruses change over time, so new vaccines are made each year  Get a tetanus-diphtheria (Td) booster vaccine  every 10 years  This vaccine protects you against tetanus and diphtheria  Tetanus is a severe infection that may cause painful muscle spasms and lockjaw  Diphtheria is a severe bacterial infection that causes a thick covering in the back of your mouth and throat  Get a human papillomavirus (HPV) vaccine  if you are female and aged 23 to 32 or male 23 to 24 and never received it  This vaccine protects you from HPV infection  HPV is the most common infection spread by sexual contact  HPV may also cause vaginal, penile, and anal cancers      Get a pneumococcal vaccine if you are aged 72 years or older  The pneumococcal vaccine is an injection given to protect you from pneumococcal disease  Pneumococcal disease is an infection caused by pneumococcal bacteria  The infection may cause pneumonia, meningitis, or an ear infection  Get a shingles vaccine  if you are 60 or older, even if you have had shingles before  The shingles vaccine is an injection to protect you from the varicella-zoster virus  This is the same virus that causes chickenpox  Shingles is a painful rash that develops in people who had chickenpox or have been exposed to the virus  How to eat healthy:  My Plate is a model for planning healthy meals  It shows the types and amounts of foods that should go on your plate  Fruits and vegetables make up about half of your plate, and grains and protein make up the other half  A serving of dairy is included on the side of your plate  The amount of calories and serving sizes you need depends on your age, gender, weight, and height  Examples of healthy foods are listed below:  Eat a variety of vegetables  such as dark green, red, and orange vegetables  You can also include canned vegetables low in sodium (salt) and frozen vegetables without added butter or sauces  Eat a variety of fresh fruits , canned fruit in 100% juice, frozen fruit, and dried fruit  Include whole grains  At least half of the grains you eat should be whole grains  Examples include whole-wheat bread, wheat pasta, brown rice, and whole-grain cereals such as oatmeal     Eat a variety of protein foods such as seafood (fish and shellfish), lean meat, and poultry without skin (turkey and chicken)  Examples of lean meats include pork leg, shoulder, or tenderloin, and beef round, sirloin, tenderloin, and extra lean ground beef  Other protein foods include eggs and egg substitutes, beans, peas, soy products, nuts, and seeds      Choose low-fat dairy products such as skim or 1% milk or low-fat yogurt, cheese, and cottage cheese  Limit unhealthy fats  such as butter, hard margarine, and shortening  Exercise:  Exercise at least 30 minutes per day on most days of the week  Some examples of exercise include walking, biking, dancing, and swimming  You can also fit in more physical activity by taking the stairs instead of the elevator or parking farther away from stores  Include muscle strengthening activities 2 days each week  Regular exercise provides many health benefits  It helps you manage your weight, and decreases your risk for type 2 diabetes, heart disease, stroke, and high blood pressure  Exercise can also help improve your mood  Ask your healthcare provider about the best exercise plan for you  General health and safety guidelines:   Do not smoke  Nicotine and other chemicals in cigarettes and cigars can cause lung damage  Ask your healthcare provider for information if you currently smoke and need help to quit  E-cigarettes or smokeless tobacco still contain nicotine  Talk to your healthcare provider before you use these products  Limit alcohol  A drink of alcohol is 12 ounces of beer, 5 ounces of wine, or 1½ ounces of liquor  Lose weight, if needed  Being overweight increases your risk of certain health conditions  These include heart disease, high blood pressure, type 2 diabetes, and certain types of cancer  Protect your skin  Do not sunbathe or use tanning beds  Use sunscreen with a SPF 15 or higher  Apply sunscreen at least 15 minutes before you go outside  Reapply sunscreen every 2 hours  Wear protective clothing, hats, and sunglasses when you are outside  Drive safely  Always wear your seatbelt  Make sure everyone in your car wears a seatbelt  A seatbelt can save your life if you are in an accident  Do not use your cell phone when you are driving  This could distract you and cause an accident  Pull over if you need to make a call or send a text message      Practice safe sex   Use latex condoms if are sexually active and have more than one partner  Your healthcare provider may recommend screening tests for sexually transmitted infections (STIs)  Wear helmets, lifejackets, and protective gear  Always wear a helmet when you ride a bike or motorcycle, go skiing, or play sports that could cause a head injury  Wear protective equipment when you play sports  Wear a lifejacket when you are on a boat or doing water sports  © Copyright [address] 2022 Information is for End User's use only and may not be sold, redistributed or otherwise used for commercial purposes  All illustrations and images included in CareNotes® are the copyrighted property of [employer] Inc  or [facility] Connie Gonzalez   The above information is an  only  It is not intended as medical advice for individual conditions or treatments  Talk to your doctor, nurse or pharmacist before following any medical regimen to see if it is safe and effective for you

## 2022-10-28 NOTE — PROGRESS NOTES
[address] [facility]    NAME: Katerine Gonzalez  AGE: 35 y o  SEX: male  : 1980     DATE: 10/28/2022     Assessment and Plan:     1  Annual physical exam  -     Comprehensive metabolic panel; Future  -     CBC and differential; Future  -     Lipid Panel with Direct LDL reflex; Future    2  Moderate persistent asthma, unspecified whether complicated  -     fluticasone-salmeterol (Advair HFA) 115-21 MCG/ACT inhaler; Inhale 2 puffs 2 (two) times a day Rinse mouth after use  -     albuterol (Proventil HFA) 90 mcg/act inhaler; Inhale 2 puffs every 6 (six) hours as needed for wheezing or shortness of breath  - Asthma has progressively gotten worse, previously intermittent per patient, now moderate persistent  - Start Advair 2x daily with Albuterol as rescue   - Return in 1 month to follow up on asthma, bring inhaler to next visit to ensure proper use     3  Need for hepatitis C screening test  -     Hepatitis C Antibody ([facility]); Future    4  Screening for HIV (human immunodeficiency virus)  -     HIV 1/2 Antigen/Antibody (4th Generation) w Reflex [facility]; Future    5  Prostate cancer screening  -     PSA, total and free; Future    6  Gastroesophageal reflux disease without esophagitis  - Pt does not want to take medications for GERD  - Educated on lifestyle modifications including diet and exercise for GERD symptom relief  - Provided further detailed instructions on AVS     7  Lipoma of left lower extremity  - Monitor closely at home   - Consider ultrasound and drainage if continues to grow in size or becomes bothersome for patient     Immunizations and preventive care screenings were discussed with patient today  Appropriate education was printed on patient's after visit summary  Discussed risks and benefits of prostate cancer screening   We discussed the controversial history of PSA screening for prostate cancer in the [place] as well as the risk of over detection and over treatment of prostate cancer by way of PSA screening  The patient understands that PSA blood testing is an imperfect way to screen for prostate cancer and that elevated PSA levels in the blood may also be caused by infection, inflammation, prostatic trauma or manipulation, urological procedures, or by benign prostatic enlargement  The role of the digital rectal examination in prostate cancer screening was also discussed and I discussed with him that there is large interobserver variability in the findings of digital rectal examination  Counseling:  Alcohol/drug use: discussed moderation in alcohol intake, the recommendations for healthy alcohol use, and avoidance of illicit drug use  Dental Health: discussed importance of regular tooth brushing, flossing, and dental visits  Injury prevention: discussed safety/seat belts, safety helmets, smoke detectors, carbon dioxide detectors, and smoking near bedding or upholstery  Sexual health: discussed sexually transmitted diseases, partner selection, use of condoms, avoidance of unintended pregnancy, and contraceptive alternatives  · Exercise: the importance of regular exercise/physical activity was discussed  Recommend exercise 3-5 times per week for at least 30 minutes  BMI Counseling: Body mass index is 30 74 kg/m²  The BMI is above normal  Nutrition recommendations include encouraging healthy choices of fruits and vegetables, decreasing fast food intake, moderation in carbohydrate intake, reducing intake of saturated and trans fat and reducing intake of cholesterol  Exercise recommendations include moderate physical activity 150 minutes/week  No pharmacotherapy was ordered  Rationale for BMI follow-up plan is due to patient being overweight or obese  Depression Screening and Follow-up Plan: Patient was screened for depression during today's encounter   They screened negative with a PHQ-2 score of 0         Return in about 1 month (around 11/28/2022) for Asthma follow up, then 1 year for annual physical       Chief Complaint:     Chief Complaint   Patient presents with   • Establish Care   • Asthma     Follow up with management   • Medication Refill     Requesting refills on rescue inhaler and albuterol solution for neb      History of Present Illness:     Adult Annual Physical   Patient here for a comprehensive physical exam  The patient reports problems - concerned about his asthma  Pt states that his asthma was controlled when he was younger but has been getting worse over the past year  He uses his albuterol inhaler 1-2 puffs, 3-4 times per day, every day, with nighttime awakenings from asthma 3-4 times a weeks  Cyst on the back of the left leg/thigh  Measures 8 cm in diameter  Soft, not bothersome, not itchy  First noticed a few years ago  Started smaller but slowly got larger over time  Pt also states he has some acid reflux while eating and has a cough  Diet and Physical Activity  · Diet/Nutrition: poor diet and frequent junk food  · Exercise: walking and walks a lot at work  Depression Screening  PHQ-2/9 Depression Screening    Little interest or pleasure in doing things: 0 - not at all  Feeling down, depressed, or hopeless: 0 - not at all  PHQ-2 Score: 0  PHQ-2 Interpretation: Negative depression screen       General Health  · Sleep: gets 4-6 hours of sleep on average, unrefreshing sleep and patient works night shifts often which affects his sleep  · Hearing: normal - bilateral   · Vision: no vision problems and most recent eye exam >1 year ago  · Dental: no dental visits for >1 year, brushes teeth twice daily and does not floss   Health  · Symptoms include: none     Review of Systems:     Review of Systems   Constitutional: Negative for activity change, appetite change, chills, fatigue and fever     HENT: Negative for sinus pressure, sinus pain, sneezing, sore throat and tinnitus  Eyes: Negative for pain and itching  Respiratory: Positive for cough, chest tightness, shortness of breath and wheezing  Cardiovascular: Negative for chest pain, palpitations and leg swelling  Gastrointestinal: Negative for abdominal pain, blood in stool, constipation, diarrhea, nausea and vomiting  Genitourinary: Negative for difficulty urinating, flank pain, frequency, hematuria and urgency  Musculoskeletal: Positive for arthralgias (knees, 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)  Negative for back pain, myalgias and neck pain  Skin: Negative for color change and rash  Neurological: Negative for dizziness, syncope, weakness and headaches  All other systems reviewed and are negative  Past Medical History:     Past Medical History:   Diagnosis Date   • Asthma       Past Surgical History:     Past Surgical History:   Procedure Laterality Date   • CHOLECYSTECTOMY     • CHOLECYSTECTOMY LAPAROSCOPIC N/A 10/7/2016    Procedure: CHOLECYSTECTOMY LAPAROSCOPIC, POSS  OPEN;  Surgeon: Azra Jama MD;  Location: [facility];  Service:    • NO PAST SURGERIES        Family History:     History reviewed  No pertinent family history  Social History:     Social History     Socioeconomic History   • Marital status: Single     Spouse name: None   • Number of children: None   • Years of education: None   • Highest education level: None   Occupational History   • None   Tobacco Use   • Smoking status: Former Smoker     Packs/day: 0 00     Years: 0 00     Pack years: 0 00     Types: Cigars     Quit date: 10/5/2015     Years since quittin 0   • Smokeless tobacco: Never Used   Vaping Use   • Vaping Use: Never used   Substance and Sexual Activity   • Alcohol use:  Yes     Alcohol/week: 1 0 standard drink     Types: 1 Cans of beer per week     Comment: weekends   • Drug use: No   • Sexual activity: Yes     Partners: Female     Birth control/protection: None   Other Topics Concern   • None   Social History Narrative   • None     Social Determinants of Health     Financial Resource Strain: Not on file   Food Insecurity: Not on file   Transportation Needs: Not on file   Physical Activity: Not on file   Stress: Not on file   Social Connections: Not on file   Intimate Partner Violence: Not on file   Housing Stability: Not on file      Current Medications:     Current Outpatient Medications   Medication Sig Dispense Refill   • albuterol (2 5 mg/3 mL) 0 083 % nebulizer solution Take by nebulization every 6 (six) hours as needed for wheezing or shortness of breath     • albuterol (Proventil HFA) 90 mcg/act inhaler Inhale 2 puffs every 6 (six) hours as needed for wheezing or shortness of breath 6 7 g 0   • fluticasone-salmeterol (Advair HFA) 115-21 MCG/ACT inhaler Inhale 2 puffs 2 (two) times a day Rinse mouth after use  12 g 1   • albuterol (PROVENTIL HFA,VENTOLIN HFA) 90 mcg/act inhaler Inhale 2 puffs every 6 (six) hours as needed for wheezing     • cyclobenzaprine (FLEXERIL) 10 mg tablet Take 0 5 tablets (5 mg total) by mouth 2 (two) times a day as needed for muscle spasms for up to 14 doses (Patient not taking: Reported on 10/28/2022) 20 tablet 0   • lidocaine (Lidoderm) 5 % Apply 1 patch topically daily Remove & Discard patch within 12 hours or as directed by MD (Patient not taking: Reported on 10/28/2022) 6 patch 0   • naproxen (Naprosyn) 500 mg tablet Take 1 tablet (500 mg total) by mouth 2 (two) times a day with meals for 7 days 14 tablet 0     No current facility-administered medications for this visit  Allergies: Allergies   Allergen Reactions   • Other      seafood   • Morphine And Related Rash      Physical Exam:     /77 (BP Location: Left arm, Patient Position: Sitting, Cuff Size: Adult)   Pulse (!) 113   Temp 98 4 °F (36 9 °C)   Resp 18   Ht 6' 5" (1 956 m)   Wt 118 kg (259 lb 3 2 oz)   SpO2 97%   BMI 30 74 kg/m²     Physical Exam  Vitals and nursing note reviewed     Constitutional: Appearance: He is well-developed  HENT:      Head: Normocephalic and atraumatic  Right Ear: Tympanic membrane, ear canal and external ear normal       Left Ear: Tympanic membrane, ear canal and external ear normal       Nose: Nose normal       Mouth/Throat:      Mouth: Mucous membranes are moist       Pharynx: Oropharynx is clear  Eyes:      General: No scleral icterus  Extraocular Movements: Extraocular movements intact  Conjunctiva/sclera: Conjunctivae normal       Pupils: Pupils are equal, round, and reactive to light  Cardiovascular:      Rate and Rhythm: Normal rate and regular rhythm  Pulses: Normal pulses  Heart sounds: Normal heart sounds  No murmur heard  Pulmonary:      Effort: Pulmonary effort is normal  No respiratory distress  Breath sounds: Normal breath sounds  Abdominal:      General: Abdomen is flat  Bowel sounds are normal       Palpations: Abdomen is soft  Tenderness: There is no abdominal tenderness  Musculoskeletal:      Cervical back: Normal range of motion and neck supple  Right lower leg: No edema  Left lower leg: No edema  Comments: Bump on left posterior thigh, 8 cm in diameter  Soft, mobile, not itchy  Consistent with lipoma    Skin:     General: Skin is warm and dry  Capillary Refill: Capillary refill takes less than 2 seconds  Neurological:      Mental Status: He is alert and oriented to person, place, and time     Psychiatric:         Behavior: Behavior normal           Reggie Hatchet, [address]

## 2022-12-05 ENCOUNTER — OFFICE VISIT (OUTPATIENT)
Dept: FAMILY MEDICINE CLINIC | Facility: CLINIC | Age: 42
End: 2022-12-05

## 2022-12-05 VITALS
HEIGHT: 77 IN | RESPIRATION RATE: 22 BRPM | DIASTOLIC BLOOD PRESSURE: 100 MMHG | OXYGEN SATURATION: 98 % | HEART RATE: 110 BPM | BODY MASS INDEX: 30.29 KG/M2 | WEIGHT: 256.5 LBS | TEMPERATURE: 97.7 F | SYSTOLIC BLOOD PRESSURE: 160 MMHG

## 2022-12-05 DIAGNOSIS — Z23 ENCOUNTER FOR IMMUNIZATION: ICD-10-CM

## 2022-12-05 DIAGNOSIS — K21.9 GASTROESOPHAGEAL REFLUX DISEASE WITHOUT ESOPHAGITIS: ICD-10-CM

## 2022-12-05 DIAGNOSIS — J45.40 MODERATE PERSISTENT ASTHMA WITHOUT COMPLICATION: Primary | ICD-10-CM

## 2022-12-05 RX ORDER — PANTOPRAZOLE SODIUM 20 MG/1
20 TABLET, DELAYED RELEASE ORAL DAILY
Qty: 30 TABLET | Refills: 0 | Status: SHIPPED | OUTPATIENT
Start: 2022-12-05

## 2022-12-05 NOTE — ASSESSMENT & PLAN NOTE
Patients notes feeling of "food stuck in throat" after eating   Advised on lifestyle modifications during last visit  Symptoms still present  Now gets cough that awakens him and sometimes causing him to vomit       · Start Protonix 20 mg daily   · Drink a full glass of water with each meal   · Do not eat at least 2 hours before laying down/sleeping   · Follow up in 1 month

## 2022-12-05 NOTE — PROGRESS NOTES
[facility] Office visit    Assessment/Plan:     1  Moderate persistent asthma without complication  Assessment & Plan:  Patients notes improved breathing and reduced SOB with Advair HFA  No more night time awakenings due to sob or need for inhaler  · Continue to use Advair HFA  · Okay to use after oral surgery   · Ensure proper oral hygiene, rinse mouth well after each use   · Keep albuterol with you for rescue PRN needs       2  Gastroesophageal reflux disease without esophagitis  Assessment & Plan:  Patients notes feeling of "food stuck in throat" after eating   Advised on lifestyle modifications during last visit  Symptoms still present  Now gets cough that awakens him and sometimes causing him to vomit  · Start Protonix 20 mg daily   · Drink a full glass of water with each meal   · Do not eat at least 2 hours before laying down/sleeping   · Follow up in 1 month       3  Encounter for immunization  -     influenza vaccine, quadrivalent, recombinant, PF, 0 5 mL, for patients 18 yr+ (FLUBLOK)        Return in about 1 month (around 1/5/2023) for Follow Up, Asthma and GERD   Subjective:   CANDI  Dilcia Burns is a 38 y o  male who is here to follow up on his asthma  Pt was prescribed Advair during last visit on 10/28/22  He states that it has helped a lot and he is breathing much better with the Advair  Pt notes that he had a loose tooth and then got an infection around this tooth with swollen gums, went to see dentist  Pt was told by dentist that Advair could have caused this infection as it was getting stuck near the tooth even with proper oral hygeine and rinsing mouth after Advair use  Pt stopped using Advair for a while because of the infection, but then noticed it was harder to breath again and was needing his albuterol more, so he went back to using Advair but only once per day instead of twice as prescribed   Per patient, he is having oral surgery on Wed 12/7/22 to have infected tooth removed and fitting for partial dentures  He wants to know if he can continue to use Advair or if there is an oral pill option to help with his asthma symptoms during the healing time  Pt also notes a severe cough that happens at night time, sometimes it is so bad that he feels nauseous, this morning he had to vomit  He also reports that occassionally when he is eating, he feels like his food gets stuck in his throat and drinking water does not help the feeling go away  This feeling will also lead him to vomit  Patient takes time to eat, does not rush, and takes his time to chew each bite properly, even though it hurts with the infection currently  Review of Systems   Constitutional: Negative for chills, fatigue and fever  HENT: Negative for congestion, rhinorrhea, sinus pressure, sinus pain, sore throat and tinnitus  Eyes: Negative for pain, redness and itching  Respiratory: Positive for cough (night time, severe cough)  Negative for chest tightness and shortness of breath  Cardiovascular: Negative for chest pain, palpitations and leg swelling  Gastrointestinal: Positive for nausea and vomiting (this am, after coughing episode)  Negative for abdominal pain and blood in stool  Genitourinary: Negative for difficulty urinating, dysuria and hematuria  Musculoskeletal: Positive for back pain (chronic, from work )  Negative for joint swelling, neck pain and neck stiffness  Skin: Negative for color change, pallor and rash  Neurological: Positive for headaches (when he doesn't eat)  All other systems reviewed and are negative  Objective:     /100 (BP Location: Left arm, Patient Position: Sitting, Cuff Size: Adult)   Pulse (!) 110   Temp 97 7 °F (36 5 °C) (Tympanic)   Resp 22   Ht 6' 5" (1 956 m)   Wt 116 kg (256 lb 8 oz)   SpO2 98%   BMI 30 42 kg/m²      Physical Exam  Vitals reviewed  Constitutional:       Appearance: He is obese     HENT:      Head: Normocephalic and atraumatic  Right Ear: External ear normal       Left Ear: External ear normal       Nose: Nose normal       Mouth/Throat:      Mouth: Mucous membranes are moist       Pharynx: Oropharynx is clear  Eyes:      General: No scleral icterus  Conjunctiva/sclera: Conjunctivae normal    Cardiovascular:      Rate and Rhythm: Normal rate and regular rhythm  Pulses: Normal pulses  Heart sounds: Normal heart sounds  Pulmonary:      Effort: Pulmonary effort is normal  No respiratory distress  Breath sounds: Normal breath sounds  No wheezing  Abdominal:      General: Abdomen is flat  Bowel sounds are normal       Palpations: Abdomen is soft  Tenderness: There is no abdominal tenderness  Musculoskeletal:      Cervical back: Normal range of motion and neck supple  Right lower leg: No edema  Left lower leg: No edema  Skin:     General: Skin is warm  Neurological:      Mental Status: He is alert and oriented to person, place, and time     Psychiatric:         Mood and Affect: Mood normal          Behavior: Behavior normal           ** Please Note: This note has been constructed using a voice recognition system Nguyen Schmidt DO  12/05/22  5:33 PM

## 2022-12-05 NOTE — ASSESSMENT & PLAN NOTE
Patients notes improved breathing and reduced SOB with Advair HFA  No more night time awakenings due to sob or need for inhaler       · Continue to use Advair HFA  · Okay to use after oral surgery   · Ensure proper oral hygiene, rinse mouth well after each use   · Keep albuterol with you for rescue PRN needs

## 2023-01-10 DIAGNOSIS — K21.9 GASTROESOPHAGEAL REFLUX DISEASE WITHOUT ESOPHAGITIS: ICD-10-CM

## 2023-01-10 RX ORDER — PANTOPRAZOLE SODIUM 20 MG/1
20 TABLET, DELAYED RELEASE ORAL DAILY
Qty: 30 TABLET | Refills: 0 | Status: SHIPPED | OUTPATIENT
Start: 2023-01-10 | End: 2023-01-16 | Stop reason: SDUPTHER

## 2023-01-16 ENCOUNTER — OFFICE VISIT (OUTPATIENT)
Dept: FAMILY MEDICINE CLINIC | Facility: CLINIC | Age: 43
End: 2023-01-16

## 2023-01-16 VITALS
BODY MASS INDEX: 31.8 KG/M2 | TEMPERATURE: 96.1 F | HEART RATE: 106 BPM | OXYGEN SATURATION: 98 % | SYSTOLIC BLOOD PRESSURE: 140 MMHG | HEIGHT: 77 IN | RESPIRATION RATE: 18 BRPM | DIASTOLIC BLOOD PRESSURE: 98 MMHG | WEIGHT: 269.3 LBS

## 2023-01-16 DIAGNOSIS — K21.9 GASTROESOPHAGEAL REFLUX DISEASE WITHOUT ESOPHAGITIS: ICD-10-CM

## 2023-01-16 DIAGNOSIS — I10 PRIMARY HYPERTENSION: Primary | ICD-10-CM

## 2023-01-16 DIAGNOSIS — J45.40 MODERATE PERSISTENT ASTHMA, UNSPECIFIED WHETHER COMPLICATED: ICD-10-CM

## 2023-01-16 RX ORDER — FLUTICASONE PROPIONATE AND SALMETEROL XINAFOATE 115; 21 UG/1; UG/1
2 AEROSOL, METERED RESPIRATORY (INHALATION) 2 TIMES DAILY
Qty: 12 G | Refills: 3 | Status: SHIPPED | OUTPATIENT
Start: 2023-01-16

## 2023-01-16 RX ORDER — PANTOPRAZOLE SODIUM 20 MG/1
20 TABLET, DELAYED RELEASE ORAL DAILY
Qty: 30 TABLET | Refills: 1 | Status: SHIPPED | OUTPATIENT
Start: 2023-01-16

## 2023-01-16 RX ORDER — IBUPROFEN 800 MG/1
TABLET ORAL
COMMUNITY
Start: 2022-11-16 | End: 2023-01-16 | Stop reason: ALTCHOICE

## 2023-01-16 RX ORDER — HYDROCHLOROTHIAZIDE 12.5 MG/1
12.5 TABLET ORAL DAILY
Qty: 30 TABLET | Refills: 2 | Status: SHIPPED | OUTPATIENT
Start: 2023-01-16

## 2023-01-16 RX ORDER — ACETAMINOPHEN AND CODEINE PHOSPHATE 300; 30 MG/1; MG/1
1 TABLET ORAL EVERY 6 HOURS PRN
COMMUNITY
Start: 2022-12-07 | End: 2023-01-16 | Stop reason: ALTCHOICE

## 2023-01-16 RX ORDER — AMOXICILLIN 500 MG/1
500 TABLET, FILM COATED ORAL EVERY 6 HOURS
COMMUNITY
Start: 2022-11-16 | End: 2023-01-16 | Stop reason: ALTCHOICE

## 2023-01-16 NOTE — PROGRESS NOTES
[facility] Office visit    Assessment/Plan:     1  Primary hypertension  Assessment & Plan:  Multiple elevated blood pressures over last 3 visits   Patient amenable to starting medication   · Will start Hydrochlorothiazide at lowest dose of 12 5 mg daily   · Follow up in 6-8 weeks for blood pressure   · Complete labs to monitor electrolytes and kidney function with Hctz     Orders:  -     Microalbumin / creatinine urine ratio  -     hydrochlorothiazide (HYDRODIURIL) 12 5 mg tablet; Take 1 tablet (12 5 mg total) by mouth daily    2  Gastroesophageal reflux disease without esophagitis  -     pantoprazole (PROTONIX) 20 mg tablet; Take 1 tablet (20 mg total) by mouth daily    3  Moderate persistent asthma, unspecified whether complicated  -     fluticasone-salmeterol (Advair HFA) 115-21 MCG/ACT inhaler; Inhale 2 puffs 2 (two) times a day Rinse mouth after use  Return in about 6 weeks (around 2/27/2023) for Blood pressure follow up   Subjective:   CANDI Castillo is a 38 y o  male who is here to follow up on his asthma  Patient has been using Advair BID and has only minimally used his albuterol  Patient has not had any night time awakenings due to asthma  He has also been taking omeprazole for GERD which has been helping and would like a refill today  Patient has had multiple elevated blood pressures during previous visits as well as today and is amenable to starting a HTN medication  Review of Systems   Constitutional: Negative for appetite change, chills and fever  HENT: Negative for ear pain, sinus pressure, sinus pain, sneezing and sore throat  Eyes: Negative for pain, redness and itching  Respiratory: Negative for cough, chest tightness and shortness of breath  Cardiovascular: Negative for chest pain, palpitations and leg swelling  Gastrointestinal: Negative for constipation, diarrhea, nausea and vomiting     Genitourinary: Negative for difficulty urinating, dysuria and hematuria  Musculoskeletal: Negative for arthralgias, back pain and myalgias  Skin: Negative for color change, pallor and rash  Neurological: Negative for dizziness, light-headedness and headaches  All other systems reviewed and are negative  Objective:     /98 (BP Location: Right arm, Patient Position: Sitting, Cuff Size: Large)   Pulse (!) 106   Temp (!) 96 1 °F (35 6 °C)   Resp 18   Ht 6' 5" (1 956 m)   Wt 122 kg (269 lb 4 8 oz)   SpO2 98%   BMI 31 93 kg/m²      Physical Exam  Constitutional:       General: He is not in acute distress  Appearance: He is obese  HENT:      Head: Normocephalic and atraumatic  Right Ear: External ear normal       Left Ear: External ear normal    Eyes:      General: No scleral icterus  Conjunctiva/sclera: Conjunctivae normal    Cardiovascular:      Rate and Rhythm: Normal rate and regular rhythm  Heart sounds: Normal heart sounds  No murmur heard  Pulmonary:      Effort: Pulmonary effort is normal  No respiratory distress  Breath sounds: Normal breath sounds  No wheezing, rhonchi or rales  Abdominal:      General: Bowel sounds are normal       Palpations: Abdomen is soft  Tenderness: There is no abdominal tenderness  Musculoskeletal:      Cervical back: Normal range of motion  Right lower leg: No edema  Left lower leg: No edema  Skin:     General: Skin is warm  Neurological:      Mental Status: He is alert and oriented to person, place, and time     Psychiatric:         Mood and Affect: Mood normal          Behavior: Behavior normal           ** Please Note: This note has been constructed using a voice recognition system Landry Vargas DO  01/16/23  6:38 PM

## 2023-01-16 NOTE — ASSESSMENT & PLAN NOTE
Multiple elevated blood pressures over last 3 visits   Patient amenable to starting medication   · Will start Hydrochlorothiazide at lowest dose of 12 5 mg daily   · Follow up in 6-8 weeks for blood pressure   · Complete labs to monitor electrolytes and kidney function with Hctz

## 2023-02-13 ENCOUNTER — APPOINTMENT (OUTPATIENT)
Dept: LAB | Facility: CLINIC | Age: 43
End: 2023-02-13

## 2023-02-13 DIAGNOSIS — Z12.5 PROSTATE CANCER SCREENING: ICD-10-CM

## 2023-02-13 DIAGNOSIS — Z11.59 NEED FOR HEPATITIS C SCREENING TEST: ICD-10-CM

## 2023-02-13 DIAGNOSIS — Z11.4 SCREENING FOR HIV (HUMAN IMMUNODEFICIENCY VIRUS): ICD-10-CM

## 2023-02-13 DIAGNOSIS — Z00.00 ANNUAL PHYSICAL EXAM: ICD-10-CM

## 2023-02-13 LAB
ALBUMIN SERPL BCP-MCNC: 3.6 G/DL (ref 3.5–5)
ALP SERPL-CCNC: 110 U/L (ref 46–116)
ALT SERPL W P-5'-P-CCNC: 51 U/L (ref 12–78)
ANION GAP SERPL CALCULATED.3IONS-SCNC: 8 MMOL/L (ref 4–13)
AST SERPL W P-5'-P-CCNC: 57 U/L (ref 5–45)
BASOPHILS # BLD AUTO: 0.03 THOUSANDS/ÂΜL (ref 0–0.1)
BASOPHILS NFR BLD AUTO: 0 % (ref 0–1)
BILIRUB SERPL-MCNC: 0.39 MG/DL (ref 0.2–1)
BUN SERPL-MCNC: 18 MG/DL (ref 5–25)
CALCIUM SERPL-MCNC: 9.2 MG/DL (ref 8.3–10.1)
CHLORIDE SERPL-SCNC: 107 MMOL/L (ref 96–108)
CHOLEST SERPL-MCNC: 173 MG/DL
CO2 SERPL-SCNC: 24 MMOL/L (ref 21–32)
CREAT SERPL-MCNC: 1.07 MG/DL (ref 0.6–1.3)
EOSINOPHIL # BLD AUTO: 0.07 THOUSAND/ÂΜL (ref 0–0.61)
EOSINOPHIL NFR BLD AUTO: 1 % (ref 0–6)
ERYTHROCYTE [DISTWIDTH] IN BLOOD BY AUTOMATED COUNT: 18.9 % (ref 11.6–15.1)
GFR SERPL CREATININE-BSD FRML MDRD: 83 ML/MIN/1.73SQ M
GLUCOSE P FAST SERPL-MCNC: 104 MG/DL (ref 65–99)
HCT VFR BLD AUTO: 33.8 % (ref 36.5–49.3)
HCV AB SER QL: NORMAL
HDLC SERPL-MCNC: 59 MG/DL
HGB BLD-MCNC: 11.3 G/DL (ref 12–17)
IMM GRANULOCYTES # BLD AUTO: 0.03 THOUSAND/UL (ref 0–0.2)
IMM GRANULOCYTES NFR BLD AUTO: 0 % (ref 0–2)
LDLC SERPL CALC-MCNC: 83 MG/DL (ref 0–100)
LYMPHOCYTES # BLD AUTO: 3.24 THOUSANDS/ÂΜL (ref 0.6–4.47)
LYMPHOCYTES NFR BLD AUTO: 32 % (ref 14–44)
MCH RBC QN AUTO: 24.6 PG (ref 26.8–34.3)
MCHC RBC AUTO-ENTMCNC: 33.4 G/DL (ref 31.4–37.4)
MCV RBC AUTO: 74 FL (ref 82–98)
MONOCYTES # BLD AUTO: 0.67 THOUSAND/ÂΜL (ref 0.17–1.22)
MONOCYTES NFR BLD AUTO: 7 % (ref 4–12)
NEUTROPHILS # BLD AUTO: 6.17 THOUSANDS/ÂΜL (ref 1.85–7.62)
NEUTS SEG NFR BLD AUTO: 60 % (ref 43–75)
NRBC BLD AUTO-RTO: 0 /100 WBCS
PLATELET # BLD AUTO: 411 THOUSANDS/UL (ref 149–390)
PMV BLD AUTO: 9.3 FL (ref 8.9–12.7)
POTASSIUM SERPL-SCNC: 3.8 MMOL/L (ref 3.5–5.3)
PROT SERPL-MCNC: 8 G/DL (ref 6.4–8.4)
RBC # BLD AUTO: 4.6 MILLION/UL (ref 3.88–5.62)
SODIUM SERPL-SCNC: 139 MMOL/L (ref 135–147)
TRIGL SERPL-MCNC: 155 MG/DL
WBC # BLD AUTO: 10.21 THOUSAND/UL (ref 4.31–10.16)

## 2023-02-14 LAB
CREAT UR-MCNC: 298 MG/DL
HIV 1+2 AB+HIV1 P24 AG SERPL QL IA: NORMAL
HIV 2 AB SERPL QL IA: NORMAL
HIV1 AB SERPL QL IA: NORMAL
HIV1 P24 AG SERPL QL IA: NORMAL
MICROALBUMIN UR-MCNC: 22.9 MG/L (ref 0–20)
MICROALBUMIN/CREAT 24H UR: 8 MG/G CREATININE (ref 0–30)
PSA FREE MFR SERPL: 38 %
PSA FREE SERPL-MCNC: 0.19 NG/ML
PSA SERPL-MCNC: 0.5 NG/ML (ref 0–4)

## 2023-03-17 ENCOUNTER — OFFICE VISIT (OUTPATIENT)
Dept: FAMILY MEDICINE CLINIC | Facility: CLINIC | Age: 43
End: 2023-03-17

## 2023-03-17 VITALS
TEMPERATURE: 96 F | BODY MASS INDEX: 31.86 KG/M2 | HEIGHT: 77 IN | SYSTOLIC BLOOD PRESSURE: 150 MMHG | RESPIRATION RATE: 18 BRPM | WEIGHT: 269.8 LBS | OXYGEN SATURATION: 97 % | HEART RATE: 99 BPM | DIASTOLIC BLOOD PRESSURE: 98 MMHG

## 2023-03-17 DIAGNOSIS — I10 PRIMARY HYPERTENSION: Primary | ICD-10-CM

## 2023-03-17 DIAGNOSIS — D50.9 MICROCYTIC ANEMIA: ICD-10-CM

## 2023-03-17 RX ORDER — NEBULIZER AND COMPRESSOR
EACH MISCELLANEOUS
COMMUNITY
Start: 2023-02-06

## 2023-03-17 RX ORDER — NEBULIZER AND COMPRESSOR
EACH MISCELLANEOUS DAILY
Qty: 1 KIT | Refills: 0 | Status: SHIPPED | OUTPATIENT
Start: 2023-03-17

## 2023-03-17 NOTE — PATIENT INSTRUCTIONS
1) Start hydrochlorothiazide daily  2)  home blood pressure check, record BP daily, bring cuff to next appointment for validation  3) Go for blood work to evaluate anemia

## 2023-03-26 NOTE — PROGRESS NOTES
"[facility] Office visit    Assessment/Plan:     1  Primary hypertension  /98, last 155/100 on 1/23, has not started prescribed BP med, asymptomatic    -Start prescribed hydrochlorothiazide 12 5 mg daily and return to office in 2 weeks for blood pressure check and to validate home blood pressure monitor     -     Blood Pressure Monitoring (Adult Blood Pressure Cuff Lg) KIT; Use in the morning    2  Microcytic anemia  Noted on lab work from 2/13/2023, Hgb 11 3, MCV 74  Denies any fatigue, melena or rectal bleeding     -     CBC and differential; Future  -     Iron Panel (Includes Ferritin, Iron Sat%, Iron, and TIBC); Future          Return in about 2 weeks (around 3/31/2023) for Recheck, HTN, Anemia, preferably Dr Sheffield Him  Subjective:   CANDI Patel is a 38 y o  male who presents for a follow-up for his hypertension  He was recently diagnosed with hypertension and prescribed hydrochlorothiazide 12 5 mg daily, but has yet to start this medication  He denies any headache, fatigue or dizziness  Review of Systems   Constitutional: Negative for chills and fatigue  HENT: Negative for congestion  Respiratory: Negative for shortness of breath  Cardiovascular: Negative for chest pain  Gastrointestinal: Negative for blood in stool, diarrhea, nausea and vomiting  Neurological: Negative for dizziness and headaches  Objective:     /98 (BP Location: Left arm, Patient Position: Sitting, Cuff Size: Large)   Pulse 99   Temp (!) 96 °F (35 6 °C)   Resp 18   Ht 6' 5\" (1 956 m)   Wt 122 kg (269 lb 12 8 oz)   SpO2 97%   BMI 31 99 kg/m²      Physical Exam  Constitutional:       General: He is not in acute distress  Appearance: He is obese  He is not ill-appearing, toxic-appearing or diaphoretic  HENT:      Head: Normocephalic  Cardiovascular:      Rate and Rhythm: Normal rate and regular rhythm  Heart sounds: Normal heart sounds  No murmur heard    Pulmonary: " Effort: Pulmonary effort is normal  No respiratory distress  Breath sounds: Normal breath sounds  Skin:     Capillary Refill: Capillary refill takes less than 2 seconds  Neurological:      Mental Status: He is alert and oriented to person, place, and time            ** Please Note: This note has been constructed using a voice recognition system **     Vale Almonte MD  03/26/23  4:08 PM

## 2023-04-24 DIAGNOSIS — K21.9 GASTROESOPHAGEAL REFLUX DISEASE WITHOUT ESOPHAGITIS: ICD-10-CM

## 2023-04-24 RX ORDER — PANTOPRAZOLE SODIUM 20 MG/1
20 TABLET, DELAYED RELEASE ORAL DAILY
Qty: 30 TABLET | Refills: 1 | Status: SHIPPED | OUTPATIENT
Start: 2023-04-24

## 2023-05-31 DIAGNOSIS — J45.40 MODERATE PERSISTENT ASTHMA, UNSPECIFIED WHETHER COMPLICATED: ICD-10-CM

## 2023-05-31 RX ORDER — ALBUTEROL SULFATE 90 UG/1
2 AEROSOL, METERED RESPIRATORY (INHALATION) EVERY 6 HOURS PRN
Qty: 6.7 G | Refills: 0 | Status: SHIPPED | OUTPATIENT
Start: 2023-05-31

## 2023-06-12 ENCOUNTER — OFFICE VISIT (OUTPATIENT)
Dept: URGENT CARE | Facility: CLINIC | Age: 43
End: 2023-06-12
Payer: COMMERCIAL

## 2023-06-12 VITALS
RESPIRATION RATE: 14 BRPM | OXYGEN SATURATION: 96 % | BODY MASS INDEX: 31.31 KG/M2 | TEMPERATURE: 97.5 F | HEART RATE: 113 BPM | WEIGHT: 264 LBS

## 2023-06-12 DIAGNOSIS — R21 RASH: Primary | ICD-10-CM

## 2023-06-12 PROCEDURE — 99213 OFFICE O/P EST LOW 20 MIN: CPT | Performed by: PHYSICIAN ASSISTANT

## 2023-06-12 RX ORDER — TRIAMCINOLONE ACETONIDE 1 MG/ML
LOTION TOPICAL 3 TIMES DAILY
Qty: 60 ML | Refills: 1 | Status: SHIPPED | OUTPATIENT
Start: 2023-06-12

## 2023-06-12 NOTE — PROGRESS NOTES
[facility]        NAME: Rick Mitchell is a 38 y o  male  : 1980    MRN: [id number]  DATE: 2023  TIME: 9:02 AM    Assessment and Plan   Rash [R21]  1  Rash  triamcinolone (KENALOG) 0 1 % lotion            Patient Instructions     Patient Instructions   Use Kenalog lotion as instructed while symptoms are present  Can also take over-the-counter antihistamine such as Claritin or Zyrtec for itching  Follow up with PCP in 3-5 days  Proceed to  ER if symptoms worsen  Chief Complaint     Chief Complaint   Patient presents with   • Rash     Pt presents with skin irritation of forearms bilateral  r/t eczema hx; started one week ago; itching           History of Present Illness       Patient is a 47-year-old male presenting today with rash of forearms x1 week  Patient notes over the last week or so he has been having a flareup of his known eczema, has been using his over-the-counter eczema lotion but notes no improvement of his symptoms, notes rash is localized to his forearms  Denies fever, chest tightness, trouble swallowing, SOB  Review of Systems   Review of Systems   Constitutional: Negative for chills and fever  HENT: Negative for ear pain and sore throat  Eyes: Negative for pain and visual disturbance  Respiratory: Negative for cough and shortness of breath  Cardiovascular: Negative for chest pain and palpitations  Gastrointestinal: Negative for abdominal pain and vomiting  Genitourinary: Negative for dysuria and hematuria  Musculoskeletal: Negative for arthralgias and back pain  Skin: Positive for rash  Neurological: Negative for seizures and syncope  All other systems reviewed and are negative          Current Medications       Current Outpatient Medications:   •  albuterol (Proventil HFA) 90 mcg/act inhaler, Inhale 2 puffs every 6 (six) hours as needed for wheezing or shortness of breath, Disp: 6 7 g, Rfl: 0  •  Blood Pressure Monitoring (Adult Blood Pressure Cuff Lg) KIT, Use in the morning, Disp: 1 kit, Rfl: 0  •  fluticasone-salmeterol (Advair HFA) 115-21 MCG/ACT inhaler, Inhale 2 puffs 2 (two) times a day Rinse mouth after use , Disp: 12 g, Rfl: 3  •  Nebulizers (InnoSpire Elegance Nebulizer) MISC, Use as directed, Disp: , Rfl:   •  pantoprazole (PROTONIX) 20 mg tablet, Take 1 tablet (20 mg total) by mouth daily, Disp: 30 tablet, Rfl: 1  •  triamcinolone (KENALOG) 0 1 % lotion, Apply topically 3 (three) times a day, Disp: 60 mL, Rfl: 1  •  cyclobenzaprine (FLEXERIL) 10 mg tablet, Take 0 5 tablets (5 mg total) by mouth 2 (two) times a day as needed for muscle spasms for up to 14 doses (Patient not taking: Reported on 6/12/2023), Disp: 20 tablet, Rfl: 0  •  hydrochlorothiazide (HYDRODIURIL) 12 5 mg tablet, Take 1 tablet (12 5 mg total) by mouth daily (Patient not taking: Reported on 3/17/2023), Disp: 30 tablet, Rfl: 2  •  lidocaine (Lidoderm) 5 %, Apply 1 patch topically daily Remove & Discard patch within 12 hours or as directed by MD (Patient not taking: Reported on 3/17/2023), Disp: 6 patch, Rfl: 0    Current Allergies     Allergies as of 06/12/2023 - Reviewed 06/12/2023   Allergen Reaction Noted   • Other  10/05/2016   • Morphine and related Rash 10/05/2016            The following portions of the patient's history were reviewed and updated as appropriate: allergies, current medications, past family history, past medical history, past social history, past surgical history and problem list      Past Medical History:   Diagnosis Date   • Asthma    • Eczema    • GI problem        Past Surgical History:   Procedure Laterality Date   • CHOLECYSTECTOMY     • CHOLECYSTECTOMY LAPAROSCOPIC N/A 10/7/2016    Procedure: CHOLECYSTECTOMY LAPAROSCOPIC, POSS  OPEN;  Surgeon: Rogers Swanson MD;  Location: [facility];  Service:    • NO PAST SURGERIES         History reviewed  No pertinent family history  Medications have been verified          Objective   Pulse (!) 113   Temp 97 5 °F (36 4 °C)   Resp 14   Wt 120 kg (264 lb)   SpO2 96%   BMI 31 31 kg/m²        Physical Exam     Physical Exam  Vitals reviewed  Constitutional:       General: He is not in acute distress  Appearance: He is well-developed  He is not toxic-appearing  HENT:      Head: Normocephalic and atraumatic  Right Ear: Tympanic membrane, ear canal and external ear normal       Left Ear: Tympanic membrane, ear canal and external ear normal       Nose: Nose normal       Mouth/Throat:      Mouth: Mucous membranes are moist       Pharynx: Oropharynx is clear  Eyes:      Conjunctiva/sclera: Conjunctivae normal    Cardiovascular:      Rate and Rhythm: Normal rate and regular rhythm  Pulses: Normal pulses  Heart sounds: Normal heart sounds  Pulmonary:      Effort: Pulmonary effort is normal       Breath sounds: Normal breath sounds  Musculoskeletal:      Cervical back: Normal range of motion  No tenderness  Lymphadenopathy:      Cervical: No cervical adenopathy  Skin:     Capillary Refill: Capillary refill takes less than 2 seconds  Findings: Rash present  Comments: Dry red papular flaky rash of forearms bilaterally, no signs of infection   Neurological:      General: No focal deficit present  Mental Status: He is alert and oriented to person, place, and time

## 2023-06-12 NOTE — PATIENT INSTRUCTIONS
Use Kenalog lotion as instructed while symptoms are present  Can also take over-the-counter antihistamine such as Claritin or Zyrtec for itching

## 2023-09-05 DIAGNOSIS — K21.9 GASTROESOPHAGEAL REFLUX DISEASE WITHOUT ESOPHAGITIS: ICD-10-CM

## 2023-09-11 RX ORDER — PANTOPRAZOLE SODIUM 20 MG/1
20 TABLET, DELAYED RELEASE ORAL DAILY
Qty: 30 TABLET | Refills: 1 | Status: SHIPPED | OUTPATIENT
Start: 2023-09-11

## 2023-09-28 ENCOUNTER — OFFICE VISIT (OUTPATIENT)
Dept: URGENT CARE | Facility: CLINIC | Age: 43
End: 2023-09-28
Payer: COMMERCIAL

## 2023-09-28 VITALS
BODY MASS INDEX: 32.97 KG/M2 | WEIGHT: 278 LBS | RESPIRATION RATE: 12 BRPM | OXYGEN SATURATION: 95 % | TEMPERATURE: 96.5 F | HEART RATE: 107 BPM

## 2023-09-28 DIAGNOSIS — L30.9 ECZEMA, UNSPECIFIED TYPE: Primary | ICD-10-CM

## 2023-09-28 PROCEDURE — 99213 OFFICE O/P EST LOW 20 MIN: CPT | Performed by: PHYSICIAN ASSISTANT

## 2023-09-28 RX ORDER — TRIAMCINOLONE ACETONIDE 5 MG/G
OINTMENT TOPICAL 2 TIMES DAILY
Qty: 30 G | Refills: 0 | Status: SHIPPED | OUTPATIENT
Start: 2023-09-28

## 2023-09-28 RX ORDER — TACROLIMUS 0.3 MG/G
OINTMENT TOPICAL 2 TIMES DAILY
Qty: 30 G | Refills: 0 | Status: SHIPPED | OUTPATIENT
Start: 2023-09-28

## 2023-09-28 NOTE — PATIENT INSTRUCTIONS
Eczema   WHAT YOU NEED TO KNOW:   Eczema is an itchy, red skin rash. You are more likely to have it if your parent or a family member has eczema, asthma, or hay fever. Eczema is a long-term condition. You may have flare-ups from time to time for the rest of your life. DISCHARGE INSTRUCTIONS:   Return to the emergency department if:   You develop a fever or have red streaks going up your arm or leg. Your rash gets more swollen, red, or hot. Call your doctor if:   Most of your skin is red, swollen, painful, and covered with scales. You develop bloody, red, painful crusts. Your skin blisters and oozes white or yellow pus. You have questions or concerns about your condition or care. Medicines:   Medicines may help reduce itching, redness, pain, and swelling. They may be given as a cream or pill. You may also receive antibiotics if you have a skin infection. Take your medicine as directed. Contact your healthcare provider if you think your medicine is not helping or if you have side effects. Tell your provider if you are allergic to any medicine. Keep a list of the medicines, vitamins, and herbs you take. Include the amounts, and when and why you take them. Bring the list or the pill bottles to follow-up visits. Carry your medicine list with you in case of an emergency. Care for your skin:   Do not scratch. Pat or press on your skin to relieve itching. Your symptoms will get worse if you scratch. Keep your fingernails short so you do not tear your skin if you do scratch. Keep your skin moist.  Rub lotion, cream, or ointment into your skin at least 2 times a day. Ask your healthcare provider what to use and how often to use it. Take baths or showers  with warm water for 10 minutes or less. Use mild bar soap. Ask your healthcare provider for the best soap for you to use. Wear cotton clothes. Wear loose-fitting clothes made from cotton or cotton blends. Avoid wool.     Use a humidifier  to add moisture to the air in your home. Avoid changes in temperature , especially activities that cause you to sweat a lot. Sweat can cause itching. Remove blankets from your bed if you get hot while you sleep. Avoid allergens, dust, and skin irritants. Do not use perfume, fabric softener, or makeup that burns or itches. Follow up with your doctor as directed:  Write down your questions so you remember to ask them during your visits. © Copyright [facility] 2023 Information is for End User's use only and may not be sold, redistributed or otherwise used for commercial purposes. The above information is an  only. It is not intended as medical advice for individual conditions or treatments. Talk to your doctor, nurse or pharmacist before following any medical regimen to see if it is safe and effective for you.

## 2023-09-28 NOTE — PROGRESS NOTES
[facility]        NAME: Gideon Delaney is a 38 y.o. male  : 1980    MRN: [id number]  DATE: 2023  TIME: 8:57 AM    Assessment and Plan   Eczema, unspecified type [L30.9]  1. Eczema, unspecified type  tacrolimus (PROTOPIC) 0.03 % ointment    triamcinolone (KENALOG) 0.5 % ointment    Ambulatory Referral to Dermatology          Patient failed topical steroid for eczema so I will begin him on topical Tacrolimus. Discussed that a side effect is burning when applied to an irritated area. It is recommended to use the topical steroid for a few days before beginning this medication. He is out the steroid lotion so I will place him on a stronger dose via ointment. Patient aware. Discussed that starting on oral pred would cause a rebound of symptoms. Patient Instructions   Patient Instructions     Eczema   WHAT YOU NEED TO KNOW:   Eczema is an itchy, red skin rash. You are more likely to have it if your parent or a family member has eczema, asthma, or hay fever. Eczema is a long-term condition. You may have flare-ups from time to time for the rest of your life. DISCHARGE INSTRUCTIONS:   Return to the emergency department if:   • You develop a fever or have red streaks going up your arm or leg. • Your rash gets more swollen, red, or hot. Call your doctor if:   • Most of your skin is red, swollen, painful, and covered with scales. • You develop bloody, red, painful crusts. • Your skin blisters and oozes white or yellow pus. • You have questions or concerns about your condition or care. Medicines:   • Medicines may help reduce itching, redness, pain, and swelling. They may be given as a cream or pill. You may also receive antibiotics if you have a skin infection. • Take your medicine as directed. Contact your healthcare provider if you think your medicine is not helping or if you have side effects. Tell your provider if you are allergic to any medicine.  Keep a list of the medicines, vitamins, and herbs you take. Include the amounts, and when and why you take them. Bring the list or the pill bottles to follow-up visits. Carry your medicine list with you in case of an emergency. Care for your skin:   • Do not scratch. Pat or press on your skin to relieve itching. Your symptoms will get worse if you scratch. Keep your fingernails short so you do not tear your skin if you do scratch. • Keep your skin moist.  Rub lotion, cream, or ointment into your skin at least 2 times a day. Ask your healthcare provider what to use and how often to use it. • Take baths or showers  with warm water for 10 minutes or less. Use mild bar soap. Ask your healthcare provider for the best soap for you to use. • Wear cotton clothes. Wear loose-fitting clothes made from cotton or cotton blends. Avoid wool. • Use a humidifier  to add moisture to the air in your home. • Avoid changes in temperature , especially activities that cause you to sweat a lot. Sweat can cause itching. Remove blankets from your bed if you get hot while you sleep. • Avoid allergens, dust, and skin irritants. Do not use perfume, fabric softener, or makeup that burns or itches. Follow up with your doctor as directed:  Write down your questions so you remember to ask them during your visits. © Copyright Wendy Carissa 2023 Information is for End User's use only and may not be sold, redistributed or otherwise used for commercial purposes. The above information is an  only. It is not intended as medical advice for individual conditions or treatments. Talk to your doctor, nurse or pharmacist before following any medical regimen to see if it is safe and effective for you. Follow up with PCP in 3-5 days. Proceed to  ER if symptoms worsen.     Chief Complaint     Chief Complaint   Patient presents with   • Rash     Pt presents with rash on the torso, spreading and worsening; started one week ago: Hx of eczema          History of Present Illness       The patient is a 53-year-old male presenting today for a flare up of his chronic eczema. He was seen here on 6/12/2023 for an eczema flareup on his left arm. He was placed on a triamcinolone 0.1% lotion. He states that that helped slightly. However, over the next week he reports that the rash is worsening and has associated burning. He has noticed the rash mostly on his flexor surfaces but also around his eyes. He expressed that he does use a moisturizing cream at least twice a day and after the shower. He uses CeraVe every day and then over the winter uses Shea butter additionally. Review of Systems   Review of Systems   Constitutional: Negative for activity change, appetite change, chills, diaphoresis and fever. HENT: Negative for congestion, ear pain, rhinorrhea and sore throat. Eyes: Negative for pain and visual disturbance. Respiratory: Negative for cough, chest tightness and shortness of breath. Cardiovascular: Negative for chest pain and palpitations. Gastrointestinal: Negative for abdominal pain, diarrhea, nausea and vomiting. Genitourinary: Negative for dysuria and hematuria. Musculoskeletal: Negative for arthralgias, back pain and myalgias. Skin: Positive for rash. Negative for color change and pallor. Neurological: Negative for seizures, syncope and headaches. All other systems reviewed and are negative.         Current Medications       Current Outpatient Medications:   •  albuterol (Proventil HFA) 90 mcg/act inhaler, Inhale 2 puffs every 6 (six) hours as needed for wheezing or shortness of breath, Disp: 6.7 g, Rfl: 0  •  Nebulizers (InnoSpire Elegance Nebulizer) MISC, Use as directed, Disp: , Rfl:   •  pantoprazole (PROTONIX) 20 mg tablet, Take 1 tablet (20 mg total) by mouth daily, Disp: 30 tablet, Rfl: 1  •  tacrolimus (PROTOPIC) 0.03 % ointment, Apply topically 2 (two) times a day, Disp: 30 g, Rfl: 0  • triamcinolone (KENALOG) 0.1 % lotion, Apply topically 3 (three) times a day, Disp: 60 mL, Rfl: 1  •  triamcinolone (KENALOG) 0.5 % ointment, Apply topically 2 (two) times a day, Disp: 30 g, Rfl: 0  •  Blood Pressure Monitoring (Adult Blood Pressure Cuff Lg) KIT, Use in the morning (Patient not taking: Reported on 9/28/2023), Disp: 1 kit, Rfl: 0  •  cyclobenzaprine (FLEXERIL) 10 mg tablet, Take 0.5 tablets (5 mg total) by mouth 2 (two) times a day as needed for muscle spasms for up to 14 doses (Patient not taking: Reported on 6/12/2023), Disp: 20 tablet, Rfl: 0  •  fluticasone-salmeterol (Advair HFA) 115-21 MCG/ACT inhaler, Inhale 2 puffs 2 (two) times a day Rinse mouth after use. (Patient not taking: Reported on 9/28/2023), Disp: 12 g, Rfl: 3  •  hydrochlorothiazide (HYDRODIURIL) 12.5 mg tablet, Take 1 tablet (12.5 mg total) by mouth daily (Patient not taking: Reported on 3/17/2023), Disp: 30 tablet, Rfl: 2  •  lidocaine (Lidoderm) 5 %, Apply 1 patch topically daily Remove & Discard patch within 12 hours or as directed by MD (Patient not taking: Reported on 3/17/2023), Disp: 6 patch, Rfl: 0    Current Allergies     Allergies as of 09/28/2023 - Reviewed 09/28/2023   Allergen Reaction Noted   • Other  10/05/2016   • Morphine and related Rash 10/05/2016            The following portions of the patient's history were reviewed and updated as appropriate: allergies, current medications, past family history, past medical history, past social history, past surgical history and problem list.     Past Medical History:   Diagnosis Date   • Asthma    • Eczema    • GI problem        Past Surgical History:   Procedure Laterality Date   • CHOLECYSTECTOMY     • CHOLECYSTECTOMY LAPAROSCOPIC N/A 10/7/2016    Procedure: CHOLECYSTECTOMY LAPAROSCOPIC, POSS. OPEN;  Surgeon: Nancy Loza MD;  Location: [facility];  Service:    • NO PAST SURGERIES         History reviewed. No pertinent family history.       Medications have been verified. Objective   Pulse (!) 107   Temp (!) 96.5 °F (35.8 °C)   Resp 12   Wt 126 kg (278 lb)   SpO2 95%   BMI 32.97 kg/m²        Physical Exam     Physical Exam  Vitals and nursing note reviewed. Constitutional:       General: He is not in acute distress. Appearance: Normal appearance. He is normal weight. He is not ill-appearing, toxic-appearing or diaphoretic. HENT:      Head: Normocephalic and atraumatic. Cardiovascular:      Rate and Rhythm: Normal rate and regular rhythm. Heart sounds: Normal heart sounds. No murmur heard. No friction rub. No gallop. Pulmonary:      Effort: Pulmonary effort is normal. No respiratory distress. Breath sounds: Normal breath sounds. No stridor. No wheezing, rhonchi or rales. Chest:      Chest wall: No tenderness. Musculoskeletal:         General: Normal range of motion. Cervical back: Normal range of motion. Lymphadenopathy:      Cervical: No cervical adenopathy. Skin:     General: Skin is warm and dry. Capillary Refill: Capillary refill takes less than 2 seconds. Findings: Rash present. Comments: Papular rash on the patient's chest, back, flexor surfaces of the elbow and the knee. Neurological:      Mental Status: He is alert.

## 2023-10-24 DIAGNOSIS — J45.40 MODERATE PERSISTENT ASTHMA, UNSPECIFIED WHETHER COMPLICATED: ICD-10-CM

## 2023-10-27 RX ORDER — ALBUTEROL SULFATE 90 UG/1
2 AEROSOL, METERED RESPIRATORY (INHALATION) EVERY 6 HOURS PRN
Qty: 6.7 G | Refills: 0 | Status: SHIPPED | OUTPATIENT
Start: 2023-10-27

## 2023-11-17 DIAGNOSIS — K21.9 GASTROESOPHAGEAL REFLUX DISEASE WITHOUT ESOPHAGITIS: ICD-10-CM

## 2023-11-17 RX ORDER — PANTOPRAZOLE SODIUM 20 MG/1
20 TABLET, DELAYED RELEASE ORAL DAILY
Qty: 30 TABLET | Refills: 1 | Status: SHIPPED | OUTPATIENT
Start: 2023-11-17

## 2024-01-24 DIAGNOSIS — K21.9 GASTROESOPHAGEAL REFLUX DISEASE WITHOUT ESOPHAGITIS: ICD-10-CM

## 2024-01-24 RX ORDER — PANTOPRAZOLE SODIUM 20 MG/1
20 TABLET, DELAYED RELEASE ORAL DAILY
Qty: 30 TABLET | Refills: 1 | Status: SHIPPED | OUTPATIENT
Start: 2024-01-24

## 2024-02-21 PROBLEM — A41.9 SEPSIS (HCC): Status: RESOLVED | Noted: 2019-03-29 | Resolved: 2024-02-21

## 2024-02-21 PROBLEM — R50.9 DEHYDRATION FEVER: Status: RESOLVED | Noted: 2019-03-25 | Resolved: 2024-02-21

## 2024-02-28 ENCOUNTER — HOSPITAL ENCOUNTER (EMERGENCY)
Facility: HOSPITAL | Age: 44
Discharge: HOME | End: 2024-02-28
Attending: EMERGENCY MEDICINE | Admitting: EMERGENCY MEDICINE
Payer: COMMERCIAL

## 2024-02-28 VITALS
HEART RATE: 119 BPM | SYSTOLIC BLOOD PRESSURE: 136 MMHG | WEIGHT: 248 LBS | OXYGEN SATURATION: 97 % | DIASTOLIC BLOOD PRESSURE: 84 MMHG | RESPIRATION RATE: 16 BRPM | TEMPERATURE: 97.5 F | BODY MASS INDEX: 29.41 KG/M2

## 2024-02-28 DIAGNOSIS — E11.9 DIABETES (HCC): Primary | ICD-10-CM

## 2024-02-28 LAB
ALBUMIN SERPL BCP-MCNC: 4.2 G/DL (ref 3.5–5)
ALP SERPL-CCNC: 160 U/L (ref 34–104)
ALT SERPL W P-5'-P-CCNC: 24 U/L (ref 7–52)
ANION GAP SERPL CALCULATED.3IONS-SCNC: 16 MMOL/L
APTT PPP: 29 SECONDS (ref 23–37)
AST SERPL W P-5'-P-CCNC: 18 U/L (ref 13–39)
B-OH-BUTYR SERPL-MCNC: 5.31 MMOL/L (ref 0.02–0.27)
BACTERIA UR QL AUTO: NORMAL /HPF
BASE EX.OXY STD BLDV CALC-SCNC: 88 % (ref 60–80)
BASE EXCESS BLDV CALC-SCNC: -6.6 MMOL/L
BASOPHILS # BLD AUTO: 0.04 THOUSANDS/ÂΜL (ref 0–0.1)
BASOPHILS NFR BLD AUTO: 0 % (ref 0–1)
BILIRUB SERPL-MCNC: 0.46 MG/DL (ref 0.2–1)
BILIRUB UR QL STRIP: NEGATIVE
BUN SERPL-MCNC: 17 MG/DL (ref 5–25)
CALCIUM SERPL-MCNC: 9.7 MG/DL (ref 8.4–10.2)
CHLORIDE SERPL-SCNC: 94 MMOL/L (ref 96–108)
CLARITY UR: CLEAR
CO2 SERPL-SCNC: 19 MMOL/L (ref 21–32)
COLOR UR: YELLOW
CREAT SERPL-MCNC: 1.15 MG/DL (ref 0.6–1.3)
EOSINOPHIL # BLD AUTO: 0.05 THOUSAND/ÂΜL (ref 0–0.61)
EOSINOPHIL NFR BLD AUTO: 1 % (ref 0–6)
ERYTHROCYTE [DISTWIDTH] IN BLOOD BY AUTOMATED COUNT: 19.4 % (ref 11.6–15.1)
GFR SERPL CREATININE-BSD FRML MDRD: 76 ML/MIN/1.73SQ M
GLUCOSE SERPL-MCNC: 267 MG/DL (ref 65–140)
GLUCOSE SERPL-MCNC: 479 MG/DL (ref 65–140)
GLUCOSE SERPL-MCNC: >500 MG/DL (ref 65–140)
GLUCOSE UR STRIP-MCNC: ABNORMAL MG/DL
HCO3 BLDV-SCNC: 18 MMOL/L (ref 24–30)
HCT VFR BLD AUTO: 39.2 % (ref 36.5–49.3)
HGB BLD-MCNC: 13.8 G/DL (ref 12–17)
HGB UR QL STRIP.AUTO: NEGATIVE
IMM GRANULOCYTES # BLD AUTO: 0.04 THOUSAND/UL (ref 0–0.2)
IMM GRANULOCYTES NFR BLD AUTO: 0 % (ref 0–2)
INR PPP: 1 (ref 0.84–1.19)
KETONES UR STRIP-MCNC: ABNORMAL MG/DL
LEUKOCYTE ESTERASE UR QL STRIP: ABNORMAL
LIPASE SERPL-CCNC: 37 U/L (ref 11–82)
LYMPHOCYTES # BLD AUTO: 2.16 THOUSANDS/ÂΜL (ref 0.6–4.47)
LYMPHOCYTES NFR BLD AUTO: 21 % (ref 14–44)
MCH RBC QN AUTO: 27.8 PG (ref 26.8–34.3)
MCHC RBC AUTO-ENTMCNC: 35.2 G/DL (ref 31.4–37.4)
MCV RBC AUTO: 79 FL (ref 82–98)
MONOCYTES # BLD AUTO: 0.69 THOUSAND/ÂΜL (ref 0.17–1.22)
MONOCYTES NFR BLD AUTO: 7 % (ref 4–12)
NEUTROPHILS # BLD AUTO: 7.57 THOUSANDS/ÂΜL (ref 1.85–7.62)
NEUTS SEG NFR BLD AUTO: 71 % (ref 43–75)
NITRITE UR QL STRIP: NEGATIVE
NON-SQ EPI CELLS URNS QL MICRO: NORMAL /HPF
NRBC BLD AUTO-RTO: 0 /100 WBCS
O2 CT BLDV-SCNC: 18.3 ML/DL
PCO2 BLDV: 33.9 MM HG (ref 42–50)
PH BLDV: 7.34 [PH] (ref 7.3–7.4)
PH UR STRIP.AUTO: 5.5 [PH]
PLATELET # BLD AUTO: 290 THOUSANDS/UL (ref 149–390)
PMV BLD AUTO: 10.3 FL (ref 8.9–12.7)
PO2 BLDV: 62.4 MM HG (ref 35–45)
POTASSIUM SERPL-SCNC: 4.6 MMOL/L (ref 3.5–5.3)
PROT SERPL-MCNC: 8 G/DL (ref 6.4–8.4)
PROT UR STRIP-MCNC: NEGATIVE MG/DL
PROTHROMBIN TIME: 13.4 SECONDS (ref 11.6–14.5)
RBC # BLD AUTO: 4.97 MILLION/UL (ref 3.88–5.62)
RBC #/AREA URNS AUTO: NORMAL /HPF
SODIUM SERPL-SCNC: 129 MMOL/L (ref 135–147)
SP GR UR STRIP.AUTO: 1.02 (ref 1–1.03)
UROBILINOGEN UR QL STRIP.AUTO: 0.2 E.U./DL
WBC # BLD AUTO: 10.55 THOUSAND/UL (ref 4.31–10.16)
WBC #/AREA URNS AUTO: NORMAL /HPF

## 2024-02-28 PROCEDURE — 82805 BLOOD GASES W/O2 SATURATION: CPT | Performed by: EMERGENCY MEDICINE

## 2024-02-28 PROCEDURE — 85610 PROTHROMBIN TIME: CPT | Performed by: EMERGENCY MEDICINE

## 2024-02-28 PROCEDURE — 82010 KETONE BODYS QUAN: CPT | Performed by: EMERGENCY MEDICINE

## 2024-02-28 PROCEDURE — 81001 URINALYSIS AUTO W/SCOPE: CPT | Performed by: EMERGENCY MEDICINE

## 2024-02-28 PROCEDURE — 82948 REAGENT STRIP/BLOOD GLUCOSE: CPT

## 2024-02-28 PROCEDURE — 36415 COLL VENOUS BLD VENIPUNCTURE: CPT | Performed by: EMERGENCY MEDICINE

## 2024-02-28 PROCEDURE — 96361 HYDRATE IV INFUSION ADD-ON: CPT

## 2024-02-28 PROCEDURE — 85730 THROMBOPLASTIN TIME PARTIAL: CPT | Performed by: EMERGENCY MEDICINE

## 2024-02-28 PROCEDURE — 93005 ELECTROCARDIOGRAM TRACING: CPT

## 2024-02-28 PROCEDURE — 99283 EMERGENCY DEPT VISIT LOW MDM: CPT

## 2024-02-28 PROCEDURE — 96374 THER/PROPH/DIAG INJ IV PUSH: CPT

## 2024-02-28 PROCEDURE — 99285 EMERGENCY DEPT VISIT HI MDM: CPT | Performed by: EMERGENCY MEDICINE

## 2024-02-28 PROCEDURE — 85025 COMPLETE CBC W/AUTO DIFF WBC: CPT | Performed by: EMERGENCY MEDICINE

## 2024-02-28 PROCEDURE — 80053 COMPREHEN METABOLIC PANEL: CPT | Performed by: EMERGENCY MEDICINE

## 2024-02-28 PROCEDURE — 83690 ASSAY OF LIPASE: CPT | Performed by: EMERGENCY MEDICINE

## 2024-02-28 RX ORDER — INSULIN GLARGINE 100 [IU]/ML
10 INJECTION, SOLUTION SUBCUTANEOUS DAILY
COMMUNITY
Start: 2024-02-21

## 2024-02-28 RX ADMIN — SODIUM CHLORIDE 1000 ML: 0.9 INJECTION, SOLUTION INTRAVENOUS at 09:47

## 2024-02-28 RX ADMIN — INSULIN HUMAN 10 UNITS: 100 INJECTION, SOLUTION PARENTERAL at 12:12

## 2024-02-28 RX ADMIN — SODIUM CHLORIDE 1000 ML: 0.9 INJECTION, SOLUTION INTRAVENOUS at 12:16

## 2024-02-28 NOTE — ED PROVIDER NOTES
History  Chief Complaint   Patient presents with    Urinary Frequency     Patient states he was told a week ago Monday that he has diabetes and was given insulin and pills to take.  States he had no way of checking his blood sugar at home until several days ago so he has not started taking any medications yet.  States tried getting an appointment with [facility] and is not able to until mid March.  C/o frequent urination, left eye pain, headaches, extreme thirst, and feeling fatigued.       Patient has been having recent symptoms of polyuria polydipsia.  He was seen by his PCP recently and diagnosed with type 2 diabetes.  Patient was given prescriptions however he did not start taking the medications yet in hopes that he would not need to.  Patient has felt a little lightheaded and assumed his sugar was low.  He has been drinking extra ginger ale juices and lots of fruit.  Patient's polyuria and polydipsia of gotten worse.  States he gets up to urinate every other hour on average.  No fever no chest pain no vomiting        Prior to Admission Medications   Prescriptions Last Dose Informant Patient Reported? Taking?   Insulin Glargine Solostar (Lantus SoloStar) 100 UNIT/ML SOPN   Yes No   Sig: Inject 10 Units under the skin daily   albuterol (Proventil HFA) 90 mcg/act inhaler   No No   Sig: Inhale 2 puffs every 6 (six) hours as needed for wheezing or shortness of breath   metFORMIN (GLUCOPHAGE) 500 mg tablet   Yes Yes   Sig: Take 1 tablet by mouth 2 (two) times a day with meals      Facility-Administered Medications: None       Past Medical History:   Diagnosis Date    Asthma     Diabetes mellitus (HCC)     Eczema     GI problem        Past Surgical History:   Procedure Laterality Date    CHOLECYSTECTOMY      CHOLECYSTECTOMY LAPAROSCOPIC N/A 10/7/2016    Procedure: CHOLECYSTECTOMY LAPAROSCOPIC, POSS. OPEN;  Surgeon: Nick Rueda MD;  Location: [facility];  Service:     NO PAST SURGERIES         History reviewed. No  pertinent family history.  I have reviewed and agree with the history as documented.    E-Cigarette/Vaping    E-Cigarette Use Never User      E-Cigarette/Vaping Substances    Nicotine No     THC No     CBD No     Flavoring No     Other No     Unknown No      Social History     Tobacco Use    Smoking status: Former     Current packs/day: 0.00     Types: Cigars, Cigarettes     Quit date: 10/5/2015     Years since quittin.4     Passive exposure: Past    Smokeless tobacco: Never   Vaping Use    Vaping status: Never Used   Substance Use Topics    Alcohol use: Yes     Alcohol/week: 1.0 standard drink of alcohol     Types: 1 Cans of beer per week     Comment: weekends    Drug use: No       Review of Systems   Constitutional:  Negative for chills and fever.   HENT:  Negative for congestion and sore throat.    Eyes:  Negative for visual disturbance.   Respiratory:  Negative for shortness of breath.    Cardiovascular:  Negative for chest pain.   Gastrointestinal:  Negative for abdominal pain, nausea and vomiting.   Endocrine: Positive for polydipsia, polyphagia and polyuria.   Genitourinary:  Positive for frequency. Negative for decreased urine volume, difficulty urinating and dysuria.   Musculoskeletal:  Negative for arthralgias and back pain.   Skin:  Negative for rash.   Neurological:  Positive for weakness and light-headedness. Negative for syncope and headaches.   Hematological:  Does not bruise/bleed easily.   Psychiatric/Behavioral:  Negative for confusion.    All other systems reviewed and are negative.      Physical Exam  Physical Exam  Vitals and nursing note reviewed.   Constitutional:       Appearance: Normal appearance.   HENT:      Head: Normocephalic.      Right Ear: External ear normal.      Left Ear: External ear normal.      Nose: Nose normal.      Mouth/Throat:      Mouth: Mucous membranes are moist.   Eyes:      Conjunctiva/sclera: Conjunctivae normal.   Cardiovascular:      Rate and Rhythm: Normal  rate and regular rhythm.      Pulses: Normal pulses.   Pulmonary:      Effort: Pulmonary effort is normal.      Breath sounds: Normal breath sounds.   Abdominal:      Palpations: Abdomen is soft.      Tenderness: There is no abdominal tenderness.   Musculoskeletal:         General: Normal range of motion.      Cervical back: Normal range of motion and neck supple.      Right lower leg: No edema.      Left lower leg: No edema.   Skin:     General: Skin is warm and dry.      Capillary Refill: Capillary refill takes less than 2 seconds.   Neurological:      General: No focal deficit present.      Mental Status: He is alert and oriented to person, place, and time.   Psychiatric:         Mood and Affect: Mood normal.         Behavior: Behavior normal.         Vital Signs  ED Triage Vitals [02/28/24 0928]   Temperature Pulse Respirations Blood Pressure SpO2   97.5 °F (36.4 °C) (!) 122 19 136/93 96 %      Temp Source Heart Rate Source Patient Position - Orthostatic VS BP Location FiO2 (%)   Tympanic Monitor Sitting Right arm --      Pain Score       No Pain           Vitals:    02/28/24 0928 02/28/24 1030 02/28/24 1204 02/28/24 1215   BP: 136/93 128/78 136/84 136/84   Pulse: (!) 122 104 (!) 119    Patient Position - Orthostatic VS: Sitting  Sitting          Visual Acuity      ED Medications  Medications   sodium chloride 0.9 % bolus 1,000 mL (0 mL Intravenous Stopped 2/28/24 1047)   sodium chloride 0.9 % bolus 1,000 mL (1,000 mL Intravenous New Bag 2/28/24 1216)   insulin regular (HumuLIN R,NovoLIN R) injection 10 Units (10 Units Intravenous Given 2/28/24 1212)       Diagnostic Studies  Results Reviewed       Procedure Component Value Units Date/Time    Fingerstick Glucose (POCT) [[id number]]  (Abnormal) Collected: 02/28/24 1256    Lab Status: Final result Updated: 02/28/24 1257     POC Glucose 267 mg/dl     Urine Microscopic [[id number]]  (Normal) Collected: 02/28/24 0946    Lab Status: Final result Specimen: Urine, Clean  Catch Updated: 02/28/24 1138     RBC, UA None Seen /hpf      WBC, UA None Seen /hpf      Epithelial Cells None Seen /hpf      Bacteria, UA None Seen /hpf     Comprehensive metabolic panel [[id number]]  (Abnormal) Collected: 02/28/24 0946    Lab Status: Final result Specimen: Blood from Arm, Left Updated: 02/28/24 1021     Sodium 129 mmol/L      Potassium 4.6 mmol/L      Chloride 94 mmol/L      CO2 19 mmol/L      ANION GAP 16 mmol/L      BUN 17 mg/dL      Creatinine 1.15 mg/dL      Glucose 479 mg/dL      Calcium 9.7 mg/dL      AST 18 U/L      ALT 24 U/L      Alkaline Phosphatase 160 U/L      Total Protein 8.0 g/dL      Albumin 4.2 g/dL      Total Bilirubin 0.46 mg/dL      eGFR 76 ml/min/1.73sq m     Narrative:      National Kidney Disease Foundation guidelines for Chronic Kidney Disease (CKD):     Stage 1 with normal or high GFR (GFR > 90 mL/min/1.73 square meters)    Stage 2 Mild CKD (GFR = 60-89 mL/min/1.73 square meters)    Stage 3A Moderate CKD (GFR = 45-59 mL/min/1.73 square meters)    Stage 3B Moderate CKD (GFR = 30-44 mL/min/1.73 square meters)    Stage 4 Severe CKD (GFR = 15-29 mL/min/1.73 square meters)    Stage 5 End Stage CKD (GFR <15 mL/min/1.73 square meters)  Note: GFR calculation is accurate only with a steady state creatinine    Beta Hydroxybutyrate [[id number]]  (Abnormal) Collected: 02/28/24 0946    Lab Status: Final result Specimen: Blood from Arm, Left Updated: 02/28/24 1021     Beta- Hydroxybutyrate 5.31 mmol/L     Lipase [[id number]]  (Normal) Collected: 02/28/24 0946    Lab Status: Final result Specimen: Blood from Arm, Left Updated: 02/28/24 1021     Lipase 37 u/L     Protime-INR [[id number]]  (Normal) Collected: 02/28/24 0946    Lab Status: Final result Specimen: Blood from Arm, Left Updated: 02/28/24 1017     Protime 13.4 seconds      INR 1.00    APTT [[id number]]  (Normal) Collected: 02/28/24 0946    Lab Status: Final result Specimen: Blood from Arm, Left Updated: 02/28/24 1017     PTT 29  seconds     UA (URINE) with reflex to Scope [[id number]]  (Abnormal) Collected: 02/28/24 0946    Lab Status: Final result Specimen: Urine, Clean Catch Updated: 02/28/24 1011     Color, UA Yellow     Clarity, UA Clear     Specific Gravity, UA 1.020     pH, UA 5.5     Leukocytes, UA Elevated glucose may cause decreased leukocyte values. See urine microscopic for UWBC result     Nitrite, UA Negative     Protein, UA Negative mg/dl      Glucose, UA >=1000 (1%) mg/dl      Ketones, UA >=80 (3+) mg/dl      Urobilinogen, UA 0.2 E.U./dl      Bilirubin, UA Negative     Occult Blood, UA Negative    Blood gas, venous [[id number]]  (Abnormal) Collected: 02/28/24 0946    Lab Status: Final result Specimen: Blood from Arm, Left Updated: 02/28/24 1004     pH, Solo 7.344     pCO2, Solo 33.9 mm Hg      pO2, Solo 62.4 mm Hg      HCO3, Solo 18.0 mmol/L      Base Excess, Solo -6.6 mmol/L      O2 Content, Solo 18.3 ml/dL      O2 HGB, VENOUS 88.0 %     CBC and differential [[id number]]  (Abnormal) Collected: 02/28/24 0946    Lab Status: Final result Specimen: Blood from Arm, Left Updated: 02/28/24 0955     WBC 10.55 Thousand/uL      RBC 4.97 Million/uL      Hemoglobin 13.8 g/dL      Hematocrit 39.2 %      MCV 79 fL      MCH 27.8 pg      MCHC 35.2 g/dL      RDW 19.4 %      MPV 10.3 fL      Platelets 290 Thousands/uL      nRBC 0 /100 WBCs      Neutrophils Relative 71 %      Immat GRANS % 0 %      Lymphocytes Relative 21 %      Monocytes Relative 7 %      Eosinophils Relative 1 %      Basophils Relative 0 %      Neutrophils Absolute 7.57 Thousands/µL      Immature Grans Absolute 0.04 Thousand/uL      Lymphocytes Absolute 2.16 Thousands/µL      Monocytes Absolute 0.69 Thousand/µL      Eosinophils Absolute 0.05 Thousand/µL      Basophils Absolute 0.04 Thousands/µL     Fingerstick Glucose (POCT) [[id number]]  (Abnormal) Collected: 02/28/24 0937    Lab Status: Final result Updated: 02/28/24 0940     POC Glucose >500 mg/dl                    No orders to  display              Procedures  ECG 12 Lead Documentation Only    Date/Time: 2/28/2024 9:54 AM    Performed by: Kenn Vidal MD  Authorized by: Kenn Vidal MD    Indications / Diagnosis:  Uncontrolled diabetes  ECG reviewed by me, the ED Provider: yes    Patient location:  ED  Interpretation:     Interpretation: normal    Rate:     ECG rate:  104    ECG rate assessment: tachycardic    Rhythm:     Rhythm: sinus tachycardia    Ectopy:     Ectopy: none    QRS:     QRS axis:  Normal    QRS intervals:  Normal  Conduction:     Conduction: normal    ST segments:     ST segments:  Normal  T waves:     T waves: normal             ED Course  ED Course as of 02/28/24 1440   Wed Feb 28, 2024   1038 Absolute Eosinophils: 0.05                                             Medical Decision Making  Fingerstick is greater than 500.  Poorly controlled and noncompliant recently diagnosed new onset diabetic.  Will check for ketosis    Amount and/or Complexity of Data Reviewed  Labs: ordered. Decision-making details documented in ED Course.    Risk  OTC drugs.             Disposition  Final diagnoses:   Diabetes (HCC)     Time reflects when diagnosis was documented in both MDM as applicable and the Disposition within this note       Time User Action Codes Description Comment    2/28/2024  2:38 PM Kenn Vidal Add [E11.9] Diabetes (HCC)           ED Disposition       ED Disposition   Discharge    Condition   Stable    Date/Time   Wed Feb 28, 2024  2:38 PM    Comment   Jared Mcghee discharge to home/self care.                   Follow-up Information       Follow up With Specialties Details Why Contact Info Additional Information    [facility] Schedule an appointment as soon as possible for a visit   [address] [place] [id number] [phone] [facility], [address] [place] [address], [id number] [phone]             Patient's Medications   Discharge Prescriptions    No medications on file       No discharge procedures on file.    PDMP Review       None            ED Provider  Electronically Signed by             Kenn Vidal MD  02/28/24 5260

## 2024-02-28 NOTE — DISCHARGE INSTRUCTIONS
Count carbohydrates as we discussed.    Avoid rice, potatoes, pasta, bread.  No sugars or juices.    Read the [place] diet book

## 2024-02-28 NOTE — Clinical Note
Jared Mcghee was seen and treated in our emergency department on 2/28/2024.                Diagnosis:     Jarde  may return to work on return date.    He may return on this date: 03/01/2024         If you have any questions or concerns, please don't hesitate to call.      Aaron Quinones RN    ______________________________           _______________          _______________  Hospital Representative                              Date                                Time

## 2024-03-01 LAB
ATRIAL RATE: 104 BPM
P AXIS: 28 DEGREES
PR INTERVAL: 152 MS
QRS AXIS: -17 DEGREES
QRSD INTERVAL: 92 MS
QT INTERVAL: 328 MS
QTC INTERVAL: 431 MS
T WAVE AXIS: 6 DEGREES
VENTRICULAR RATE: 104 BPM

## 2024-03-01 PROCEDURE — 93010 ELECTROCARDIOGRAM REPORT: CPT | Performed by: INTERNAL MEDICINE

## 2024-03-13 PROBLEM — E11.9 TYPE 2 DIABETES MELLITUS WITHOUT COMPLICATION, WITHOUT LONG-TERM CURRENT USE OF INSULIN (HCC): Status: ACTIVE | Noted: 2024-03-13

## 2024-03-14 NOTE — PROGRESS NOTES
ADULT ANNUAL PHYSICAL  [facility]    NAME: Jared Mcghee  AGE: 43 y.o. SEX: male  : 1980     DATE: 3/18/2024     Assessment and Plan:     Problem List Items Addressed This Visit     Type 2 diabetes mellitus without complication, without long-term current use of insulin (HCC)   Other Visit Diagnoses     Annual physical exam    -  Primary            Immunizations and preventive care screenings were discussed with patient today. Appropriate education was printed on patient's after visit summary.    {Prostate cancer screening - consider in males between age of 40-75 depending on age, race, and risk factors. There is difference in the guidelines in regards to the optimal age for screening.  USPSTF states to consider periodic screening in males between the age of 50 to 69. This text is informational and does not need to be selected but if you wish, you can insert standard documentation in your progress note by using F2 (Optional):81458}    Counseling:  {Annual Physical; Counselin}         No follow-ups on file.     Chief Complaint:     No chief complaint on file.     History of Present Illness:     Adult Annual Physical   Patient here for a comprehensive physical exam. The patient reports {problems:67301}.    Diet and Physical Activity  Diet/Nutrition: {annual physical; diet:94144211}.   Exercise: {annual physical; exercise:53801175}.      Depression Screening  PHQ-2/9 Depression Screening         General Health  Sleep: sleeps well.   Hearing: {annual physical; hearin}.  Vision: {annual physical; vision:59446419}.   Dental: {annual physical; dental:07441743}.        Health  Symptoms include: {annual physical; urinary symptoms:52000}    Advanced Care Planning  Do you have an advanced directive? {YES/NO:73430}  Do you have a durable medical power of ? {YES/NO:41342}  ACP document given to patient?  {YES/NO:}     Review of Systems:     Review of Systems   Past Medical History:     Past Medical History:   Diagnosis Date   • Asthma    • Diabetes mellitus (HCC)    • Eczema    • GI problem       Past Surgical History:     Past Surgical History:   Procedure Laterality Date   • CHOLECYSTECTOMY     • CHOLECYSTECTOMY LAPAROSCOPIC N/A 10/7/2016    Procedure: CHOLECYSTECTOMY LAPAROSCOPIC, POSS. OPEN;  Surgeon: Nick Rueda MD;  Location: [facility];  Service:    • NO PAST SURGERIES        Family History:     No family history on file.   Social History:     Social History     Socioeconomic History   • Marital status: Single     Spouse name: Not on file   • Number of children: Not on file   • Years of education: Not on file   • Highest education level: Not on file   Occupational History   • Not on file   Tobacco Use   • Smoking status: Former     Current packs/day: 0.00     Types: Cigars, Cigarettes     Quit date: 10/5/2015     Years since quittin.4     Passive exposure: Past   • Smokeless tobacco: Never   Vaping Use   • Vaping status: Never Used   Substance and Sexual Activity   • Alcohol use: Yes     Alcohol/week: 1.0 standard drink of alcohol     Types: 1 Cans of beer per week     Comment: weekends   • Drug use: No   • Sexual activity: Yes     Partners: Female     Birth control/protection: None   Other Topics Concern   • Not on file   Social History Narrative   • Not on file     Social Determinants of Health     Financial Resource Strain: Not on file   Food Insecurity: Not on file   Transportation Needs: Not on file   Physical Activity: Not on file   Stress: Not on file   Social Connections: Not on file   Intimate Partner Violence: Not on file   Housing Stability: Not on file      Current Medications:     Current Outpatient Medications   Medication Sig Dispense Refill   • albuterol (Proventil HFA) 90 mcg/act inhaler Inhale 2 puffs every 6 (six) hours as needed for wheezing or shortness of breath 6.7 g 0   •  Insulin Glargine Solostar (Lantus SoloStar) 100 UNIT/ML SOPN Inject 10 Units under the skin daily     • metFORMIN (GLUCOPHAGE) 500 mg tablet Take 1 tablet by mouth 2 (two) times a day with meals       No current facility-administered medications for this visit.      Allergies:     Allergies   Allergen Reactions   • Other Anaphylaxis     seafood   • Morphine And Related Rash      Physical Exam:     There were no vitals taken for this visit.    Physical Exam     Alexia Bartlett MD  [facility]

## 2024-03-18 ENCOUNTER — OFFICE VISIT (OUTPATIENT)
Age: 44
End: 2024-03-18

## 2024-03-18 VITALS
WEIGHT: 259 LBS | HEIGHT: 77 IN | BODY MASS INDEX: 30.58 KG/M2 | DIASTOLIC BLOOD PRESSURE: 97 MMHG | RESPIRATION RATE: 19 BRPM | OXYGEN SATURATION: 96 % | HEART RATE: 100 BPM | SYSTOLIC BLOOD PRESSURE: 160 MMHG

## 2024-03-18 DIAGNOSIS — Z12.11 SCREENING FOR COLON CANCER: ICD-10-CM

## 2024-03-18 DIAGNOSIS — E11.9 TYPE 2 DIABETES MELLITUS WITHOUT COMPLICATION, WITHOUT LONG-TERM CURRENT USE OF INSULIN (HCC): ICD-10-CM

## 2024-03-18 DIAGNOSIS — Z00.00 ANNUAL PHYSICAL EXAM: Primary | ICD-10-CM

## 2024-03-18 DIAGNOSIS — I10 PRIMARY HYPERTENSION: ICD-10-CM

## 2024-03-18 DIAGNOSIS — T73.2XXA FATIGUE DUE TO EXPOSURE, INITIAL ENCOUNTER: ICD-10-CM

## 2024-03-18 PROBLEM — R03.0 BLOOD PRESSURE ELEVATED WITHOUT HISTORY OF HTN: Status: ACTIVE | Noted: 2024-03-18

## 2024-03-18 PROBLEM — R53.83 FATIGUE: Status: ACTIVE | Noted: 2024-03-18

## 2024-03-18 PROBLEM — R03.0 BLOOD PRESSURE ELEVATED WITHOUT HISTORY OF HTN: Status: RESOLVED | Noted: 2024-03-18 | Resolved: 2024-03-18

## 2024-03-18 LAB
LEFT EYE DIABETIC RETINOPATHY: NORMAL
LEFT EYE IMAGE QUALITY: NORMAL
LEFT EYE MACULAR EDEMA: NORMAL
LEFT EYE OTHER RETINOPATHY: NORMAL
RIGHT EYE DIABETIC RETINOPATHY: NORMAL
RIGHT EYE IMAGE QUALITY: NORMAL
RIGHT EYE MACULAR EDEMA: NORMAL
RIGHT EYE OTHER RETINOPATHY: NORMAL
SEVERITY (EYE EXAM): NORMAL
SL AMB POCT HEMOGLOBIN AIC: 10.8 (ref ?–6.5)

## 2024-03-18 PROCEDURE — 99396 PREV VISIT EST AGE 40-64: CPT | Performed by: FAMILY MEDICINE

## 2024-03-18 PROCEDURE — 83036 HEMOGLOBIN GLYCOSYLATED A1C: CPT | Performed by: FAMILY MEDICINE

## 2024-03-18 RX ORDER — LISINOPRIL 10 MG/1
10 TABLET ORAL DAILY
Qty: 30 TABLET | Refills: 0 | Status: SHIPPED | OUTPATIENT
Start: 2024-03-18 | End: 2024-04-17

## 2024-03-18 RX ORDER — ATORVASTATIN CALCIUM 20 MG/1
20 TABLET, FILM COATED ORAL DAILY
Qty: 90 TABLET | Refills: 1 | Status: CANCELLED | OUTPATIENT
Start: 2024-03-18

## 2024-03-18 RX ORDER — ROSUVASTATIN CALCIUM 10 MG/1
10 TABLET, COATED ORAL DAILY
Qty: 30 TABLET | Refills: 0 | Status: CANCELLED | OUTPATIENT
Start: 2024-03-18 | End: 2024-04-17

## 2024-03-18 RX ORDER — PANTOPRAZOLE SODIUM 20 MG/1
20 TABLET, DELAYED RELEASE ORAL DAILY
Qty: 30 TABLET | Refills: 2 | Status: SHIPPED | OUTPATIENT
Start: 2024-03-18 | End: 2024-06-16

## 2024-03-18 NOTE — ASSESSMENT & PLAN NOTE
Lab Results   Component Value Date    HGBA1C 10.8 (A) 03/18/2024     Pt states that he had increased urinary frequency since last couple months. Went to [facility] to see a doctor since we did not have any openings on 2/21. Pt was started on metformin 500mg BID and 10U of lantus.He went to the ED couple days later due to not feeling well and severe headache. Pt states that he feels more fatique, since he started taking the medication and has daily headaches with GI upset. Pt has a family history of diabetes. Denies any family history of heart diease.     Recommended making lifestyle changes to diet and exercise.   Decrease carbohydrate and sugar intake including sodas, juices  Stop taking metformin 500mg   Start taking Jardiance 10mg daily  Start taking januvia 50mg daily  Measure BG TID  Continue 10U of lantus at bedtime  Consider Endocrinology referral if HBA1c is not controlled  Consider started Crestor 10mg dialy for cardio protection during next visit, pt refused at this time  RTO in 6 weeks

## 2024-03-18 NOTE — PROGRESS NOTES
ADULT ANNUAL PHYSICAL  [facility]ACT[facility]    NAME: Jared Mcghee  AGE: 43 y.o. SEX: male  : 1980     DATE: 3/18/2024     Assessment and Plan:     1. Annual physical exam  -     pantoprazole (PROTONIX) 20 mg tablet; Take 1 tablet (20 mg total) by mouth daily  -     CBC and differential; Future  -     Comprehensive metabolic panel; Future  -     lisinopril (ZESTRIL) 10 mg tablet; Take 1 tablet (10 mg total) by mouth daily  -     Magnesium, RBC; Future    2. Type 2 diabetes mellitus without complication, without long-term current use of insulin (HCC)  Assessment & Plan:    Lab Results   Component Value Date    HGBA1C 10.8 (A) 2024     Pt states that he had increased urinary frequency since last couple months. Went to [facility] to see a doctor since we did not have any openings on . Pt was started on metformin 500mg BID and 10U of lantus.He went to the ED couple days later due to not feeling well and severe headache. Pt states that he feels more fatique, since he started taking the medication and has daily headaches with GI upset. Pt has a family history of diabetes. Denies any family history of heart diease.     Recommended making lifestyle changes to diet and exercise.   Decrease carbohydrate and sugar intake including sodas, juices  Stop taking metformin 500mg   Start taking Jardiance 10mg daily  Start taking januvia 50mg daily  Measure BG TID  Continue 10U of lantus at bedtime  Consider Endocrinology referral if HBA1c is not controlled  Consider started Crestor 10mg dialy for cardio protection during next visit, pt refused at this time  RTO in 6 weeks    Orders:  -     POCT hemoglobin A1c  -     sitaGLIPtin (JANUVIA) 100 mg tablet; Take 0.5 tablets (50 mg total) by mouth daily  -     Lipid panel; Future  -     Empagliflozin (Jardiance) 10 MG TABS tablet; Take 1 tablet (10 mg total) by mouth every morning  -     Microalbumin,  urine, 24 hour; Future  -     IRIS Diabetic eye exam    3. Primary hypertension  Assessment & Plan:  Pt has a diagnosed hx of htn but states that he does not take any medications.     Start lisinopril 10mg daily  Gave blood pressure log to measure at home  CMP ordered  RTO in 6 weeks      4. Screening for colon cancer  -     Ambulatory Referral to Gastroenterology; Future    5. Fatigue due to exposure, initial encounter  -     CBC and differential; Future            Immunizations and preventive care screenings were discussed with patient today. Appropriate education was printed on patient's after visit summary.      Counseling:  Alcohol/drug use: discussed moderation in alcohol intake, the recommendations for healthy alcohol use, and avoidance of illicit drug use.  Exercise: the importance of regular exercise/physical activity was discussed. Recommend exercise 3-5 times per week for at least 30 minutes.     BMI Counseling: Body mass index is 30.71 kg/m². The BMI is above normal. Nutrition recommendations include decreasing portion sizes, decreasing fast food intake and limiting drinks that contain sugar. Exercise recommendations include moderate physical activity 150 minutes/week and exercising 3-5 times per week. Rationale for BMI follow-up plan is due to patient being overweight or obese.     Depression Screening and Follow-up Plan: Patient was screened for depression during today's encounter. They screened negative with a PHQ-2 score of 0.        Return in about 6 weeks (around 4/29/2024) for Recheck in 6 weeks with PCP.     Chief Complaint:     Chief Complaint   Patient presents with   • Physical Exam     Started new medication and is now having headaches      History of Present Illness:     Adult Annual Physical   Patient here for a comprehensive physical exam. The patient reports problems - headaches, GI upset, fatigue since starting metformin .    Diet and Physical Activity  Diet/Nutrition: well balanced  diet.   Exercise: walking.      Depression Screening  PHQ-2/9 Depression Screening    Little interest or pleasure in doing things: 0 - not at all  Feeling down, depressed, or hopeless: 0 - not at all  PHQ-2 Score: 0  PHQ-2 Interpretation: Negative depression screen       General Health  Sleep: sleeps well and snores loudly.   Hearing: normal - bilateral.  Vision: vision problems: sometimes sees blurry when he is looking at a screen. Will schedule appointment to see eye doctor .   Dental: no dental visits for >1 year.        Health  Symptoms include: none       Review of Systems:     Review of Systems   Constitutional:  Negative for chills, fatigue and fever.   HENT:  Negative for congestion.    Respiratory:  Negative for cough and shortness of breath.    Cardiovascular:  Negative for chest pain and palpitations.   Gastrointestinal:  Negative for blood in stool, diarrhea and rectal pain.   Genitourinary:  Negative for difficulty urinating, dysuria and hematuria.   Musculoskeletal:  Negative for arthralgias and back pain.   Skin:  Negative for pallor and wound.   Neurological:  Positive for headaches. Negative for light-headedness.      Past Medical History:     Past Medical History:   Diagnosis Date   • Asthma    • Diabetes mellitus (HCC)    • Eczema    • GI problem       Past Surgical History:     Past Surgical History:   Procedure Laterality Date   • CHOLECYSTECTOMY     • CHOLECYSTECTOMY LAPAROSCOPIC N/A 10/7/2016    Procedure: CHOLECYSTECTOMY LAPAROSCOPIC, POSS. OPEN;  Surgeon: Nick Rueda MD;  Location: [facility];  Service:    • NO PAST SURGERIES        Family History:     No family history on file.   Social History:     Social History     Socioeconomic History   • Marital status: Single     Spouse name: Not on file   • Number of children: Not on file   • Years of education: Not on file   • Highest education level: Not on file   Occupational History   • Not on file   Tobacco Use   • Smoking status: Former      Current packs/day: 0.00     Types: Cigars, Cigarettes     Quit date: 10/5/2015     Years since quittin.4     Passive exposure: Past   • Smokeless tobacco: Never   Vaping Use   • Vaping status: Never Used   Substance and Sexual Activity   • Alcohol use: Yes     Alcohol/week: 1.0 standard drink of alcohol     Types: 1 Cans of beer per week     Comment: weekends   • Drug use: No   • Sexual activity: Yes     Partners: Female     Birth control/protection: None   Other Topics Concern   • Not on file   Social History Narrative   • Not on file     Social Determinants of Health     Financial Resource Strain: Low Risk  (3/18/2024)    Overall Financial Resource Strain (CARDIA)    • Difficulty of Paying Living Expenses: Not hard at all   Food Insecurity: No Food Insecurity (3/18/2024)    Hunger Vital Sign    • Worried About Running Out of Food in the Last Year: Never true    • Ran Out of Food in the Last Year: Never true   Transportation Needs: No Transportation Needs (3/18/2024)    PRAPARE - Transportation    • Lack of Transportation (Medical): No    • Lack of Transportation (Non-Medical): No   Physical Activity: Not on file   Stress: Not on file   Social Connections: Not on file   Intimate Partner Violence: Not on file   Housing Stability: Unknown (3/18/2024)    Housing Stability Vital Sign    • Unable to Pay for Housing in the Last Year: No    • Number of Places Lived in the Last Year: Not on file    • Unstable Housing in the Last Year: No      Current Medications:     Current Outpatient Medications   Medication Sig Dispense Refill   • Empagliflozin (Jardiance) 10 MG TABS tablet Take 1 tablet (10 mg total) by mouth every morning 30 tablet 0   • lisinopril (ZESTRIL) 10 mg tablet Take 1 tablet (10 mg total) by mouth daily 30 tablet 0   • pantoprazole (PROTONIX) 20 mg tablet Take 1 tablet (20 mg total) by mouth daily 30 tablet 2   • sitaGLIPtin (JANUVIA) 100 mg tablet Take 0.5 tablets (50 mg total) by mouth daily 30  "tablet 5   • albuterol (Proventil HFA) 90 mcg/act inhaler Inhale 2 puffs every 6 (six) hours as needed for wheezing or shortness of breath 6.7 g 0   • Insulin Glargine Solostar (Lantus SoloStar) 100 UNIT/ML SOPN Inject 10 Units under the skin daily       No current facility-administered medications for this visit.      Allergies:     Allergies   Allergen Reactions   • Other Anaphylaxis     seafood   • Morphine And Related Rash      Physical Exam:     /97 (BP Location: Left arm, Patient Position: Sitting)   Pulse 100   Resp 19   Ht 6' 5\" (1.956 m)   Wt 117 kg (259 lb)   SpO2 96%   BMI 30.71 kg/m²     Physical Exam  Vitals and nursing note reviewed.   Constitutional:       General: He is not in acute distress.     Appearance: He is well-developed. He is obese.   HENT:      Head: Normocephalic and atraumatic.      Right Ear: Tympanic membrane normal.      Left Ear: Tympanic membrane normal.      Nose: Nose normal.      Mouth/Throat:      Mouth: Mucous membranes are moist.   Eyes:      Extraocular Movements: Extraocular movements intact.      Conjunctiva/sclera: Conjunctivae normal.      Pupils: Pupils are equal, round, and reactive to light.   Cardiovascular:      Rate and Rhythm: Normal rate and regular rhythm.      Heart sounds: No murmur heard.  Pulmonary:      Effort: Pulmonary effort is normal. No respiratory distress.      Breath sounds: Normal breath sounds.   Abdominal:      General: Abdomen is flat. Bowel sounds are normal.      Palpations: Abdomen is soft.      Tenderness: There is no abdominal tenderness.   Musculoskeletal:         General: No swelling.      Cervical back: Normal range of motion and neck supple.   Skin:     General: Skin is warm and dry.      Capillary Refill: Capillary refill takes less than 2 seconds.   Neurological:      General: No focal deficit present.      Mental Status: He is alert and oriented to person, place, and time.   Psychiatric:         Mood and Affect: Mood " normal.         Behavior: Behavior normal.         Thought Content: Thought content normal.         Judgment: Judgment normal.          Alexia Bartlett MD  [facility]

## 2024-03-18 NOTE — ASSESSMENT & PLAN NOTE
Pt has a diagnosed hx of htn but states that he does not take any medications.     Start lisinopril 10mg daily  Gave blood pressure log to measure at home  CMP ordered  RTO in 6 weeks

## 2024-03-25 ENCOUNTER — TELEPHONE (OUTPATIENT)
Age: 44
End: 2024-03-25

## 2024-03-25 ENCOUNTER — APPOINTMENT (OUTPATIENT)
Dept: LAB | Facility: CLINIC | Age: 44
End: 2024-03-25
Payer: COMMERCIAL

## 2024-03-25 DIAGNOSIS — E11.9 TYPE 2 DIABETES MELLITUS WITHOUT COMPLICATION, WITHOUT LONG-TERM CURRENT USE OF INSULIN (HCC): ICD-10-CM

## 2024-03-25 DIAGNOSIS — T73.2XXA FATIGUE DUE TO EXPOSURE, INITIAL ENCOUNTER: ICD-10-CM

## 2024-03-25 DIAGNOSIS — Z00.00 ANNUAL PHYSICAL EXAM: ICD-10-CM

## 2024-03-25 LAB
ALBUMIN SERPL BCP-MCNC: 4 G/DL (ref 3.5–5)
ALP SERPL-CCNC: 92 U/L (ref 34–104)
ALT SERPL W P-5'-P-CCNC: 21 U/L (ref 7–52)
ANION GAP SERPL CALCULATED.3IONS-SCNC: 7 MMOL/L (ref 4–13)
AST SERPL W P-5'-P-CCNC: 20 U/L (ref 13–39)
BASOPHILS # BLD AUTO: 0.04 THOUSANDS/ÂΜL (ref 0–0.1)
BASOPHILS NFR BLD AUTO: 1 % (ref 0–1)
BILIRUB SERPL-MCNC: 0.48 MG/DL (ref 0.2–1)
BUN SERPL-MCNC: 14 MG/DL (ref 5–25)
CALCIUM SERPL-MCNC: 9.2 MG/DL (ref 8.4–10.2)
CHLORIDE SERPL-SCNC: 104 MMOL/L (ref 96–108)
CHOLEST SERPL-MCNC: 199 MG/DL
CO2 SERPL-SCNC: 28 MMOL/L (ref 21–32)
CREAT SERPL-MCNC: 0.82 MG/DL (ref 0.6–1.3)
EOSINOPHIL # BLD AUTO: 0.14 THOUSAND/ÂΜL (ref 0–0.61)
EOSINOPHIL NFR BLD AUTO: 2 % (ref 0–6)
ERYTHROCYTE [DISTWIDTH] IN BLOOD BY AUTOMATED COUNT: 18.1 % (ref 11.6–15.1)
GFR SERPL CREATININE-BSD FRML MDRD: 106 ML/MIN/1.73SQ M
GLUCOSE P FAST SERPL-MCNC: 116 MG/DL (ref 65–99)
HCT VFR BLD AUTO: 37.3 % (ref 36.5–49.3)
HDLC SERPL-MCNC: 54 MG/DL
HGB BLD-MCNC: 13 G/DL (ref 12–17)
IMM GRANULOCYTES # BLD AUTO: 0.02 THOUSAND/UL (ref 0–0.2)
IMM GRANULOCYTES NFR BLD AUTO: 0 % (ref 0–2)
LDLC SERPL CALC-MCNC: 126 MG/DL (ref 0–100)
LYMPHOCYTES # BLD AUTO: 2.56 THOUSANDS/ÂΜL (ref 0.6–4.47)
LYMPHOCYTES NFR BLD AUTO: 32 % (ref 14–44)
MCH RBC QN AUTO: 27.5 PG (ref 26.8–34.3)
MCHC RBC AUTO-ENTMCNC: 34.9 G/DL (ref 31.4–37.4)
MCV RBC AUTO: 79 FL (ref 82–98)
MONOCYTES # BLD AUTO: 0.62 THOUSAND/ÂΜL (ref 0.17–1.22)
MONOCYTES NFR BLD AUTO: 8 % (ref 4–12)
NEUTROPHILS # BLD AUTO: 4.61 THOUSANDS/ÂΜL (ref 1.85–7.62)
NEUTS SEG NFR BLD AUTO: 57 % (ref 43–75)
NONHDLC SERPL-MCNC: 145 MG/DL
NRBC BLD AUTO-RTO: 0 /100 WBCS
PLATELET # BLD AUTO: 431 THOUSANDS/UL (ref 149–390)
PMV BLD AUTO: 9.5 FL (ref 8.9–12.7)
POTASSIUM SERPL-SCNC: 4.1 MMOL/L (ref 3.5–5.3)
PROT SERPL-MCNC: 7.4 G/DL (ref 6.4–8.4)
RBC # BLD AUTO: 4.73 MILLION/UL (ref 3.88–5.62)
SODIUM SERPL-SCNC: 139 MMOL/L (ref 135–147)
TRIGL SERPL-MCNC: 93 MG/DL
WBC # BLD AUTO: 7.99 THOUSAND/UL (ref 4.31–10.16)

## 2024-03-25 PROCEDURE — 85025 COMPLETE CBC W/AUTO DIFF WBC: CPT

## 2024-03-25 PROCEDURE — 80053 COMPREHEN METABOLIC PANEL: CPT

## 2024-03-25 PROCEDURE — 83735 ASSAY OF MAGNESIUM: CPT

## 2024-03-25 PROCEDURE — 36415 COLL VENOUS BLD VENIPUNCTURE: CPT

## 2024-03-25 PROCEDURE — 80061 LIPID PANEL: CPT

## 2024-03-25 NOTE — TELEPHONE ENCOUNTER
Thank you Lucero. Please advise patient he can just throw it away. He can also bring it back to the pharmacy and they can dispose of it.

## 2024-03-25 NOTE — TELEPHONE ENCOUNTER
Pt came in office and stated he received Metformin 500mg from pharmacy and was not suppose to take it anymore due to  getting headaches when taking it. Patient would like to know what should he do with the medication. Please call [phone].

## 2024-03-31 LAB — MAGNESIUM RBC-MCNC: 4.9 MG/DL (ref 3.7–7)

## 2024-04-14 DIAGNOSIS — E11.9 TYPE 2 DIABETES MELLITUS WITHOUT COMPLICATION, WITHOUT LONG-TERM CURRENT USE OF INSULIN (HCC): ICD-10-CM

## 2024-04-14 DIAGNOSIS — Z00.00 ANNUAL PHYSICAL EXAM: ICD-10-CM

## 2024-04-14 PROCEDURE — 4010F ACE/ARB THERAPY RXD/TAKEN: CPT | Performed by: FAMILY MEDICINE

## 2024-04-14 RX ORDER — EMPAGLIFLOZIN 10 MG/1
10 TABLET, FILM COATED ORAL EVERY MORNING
Qty: 30 TABLET | Refills: 0 | Status: SHIPPED | OUTPATIENT
Start: 2024-04-14

## 2024-04-14 RX ORDER — LISINOPRIL 10 MG/1
10 TABLET ORAL DAILY
Qty: 30 TABLET | Refills: 0 | Status: SHIPPED | OUTPATIENT
Start: 2024-04-14

## 2024-04-24 ENCOUNTER — OFFICE VISIT (OUTPATIENT)
Age: 44
End: 2024-04-24

## 2024-04-24 VITALS
RESPIRATION RATE: 20 BRPM | OXYGEN SATURATION: 97 % | WEIGHT: 253 LBS | DIASTOLIC BLOOD PRESSURE: 92 MMHG | TEMPERATURE: 98.4 F | BODY MASS INDEX: 29.87 KG/M2 | HEIGHT: 77 IN | SYSTOLIC BLOOD PRESSURE: 153 MMHG | HEART RATE: 110 BPM

## 2024-04-24 DIAGNOSIS — R09.81 SINUS CONGESTION: Primary | ICD-10-CM

## 2024-04-24 PROCEDURE — 99213 OFFICE O/P EST LOW 20 MIN: CPT | Performed by: FAMILY MEDICINE

## 2024-04-24 RX ORDER — LORATADINE 10 MG/1
10 TABLET ORAL DAILY
Qty: 30 TABLET | Refills: 0 | Status: SHIPPED | OUTPATIENT
Start: 2024-04-24

## 2024-04-24 RX ORDER — FLUTICASONE PROPIONATE 50 MCG
1 SPRAY, SUSPENSION (ML) NASAL DAILY
Qty: 15.8 ML | Refills: 1 | Status: SHIPPED | OUTPATIENT
Start: 2024-04-24

## 2024-04-24 NOTE — PROGRESS NOTES
[facility] Office Visit    Patient Name: Jared Mcghee  MRN: [id number]    Assessment/Plan:     1. Sinus congestion  Possible Viral URI vs seasonal allergies     Symptomatic management/supportive measures   OTC antipyretics, analgesics, or antitussives for fever, headache, myalgias, and cough  Antihistamines for congestion/sneezing   Maintain adequate hydration. Drink fluids regularly to avoid dehydration.   Home remedies   Netti pot or saline spray for nasal congestion   Menthol/Vicks inhalers or steam for congestion   Warm tea with honey and salt water gargle for sore throat   Get plenty of rest   Continue daily activities as tolerated   Symptom resolution and complete recovery time vary per patient   Cough may last 6-8 weeks after viral URI resolution   ED precautions for cough/dyspnea given  RTO if symptoms do not improve or worsen in 3-5 days     -     fluticasone (FLONASE) 50 mcg/act nasal spray; 1 spray into each nostril daily  -     loratadine (CLARITIN) 10 mg tablet; Take 1 tablet (10 mg total) by mouth daily          Return in about 3 months (around 7/24/2024) for Diabetes Follow Up with Foot Exam.     Subjective:   HPI  Jared Mcghee is a 43 y.o. male who presents for follow up. He has a pmhx of diabetes and hypertension. Started taking Jardiance 10 mg in the morning before bed (he works night shift). No complications reported. Has been measuring his blood glucose every day, averages around 117. Has not done colon cancer screening, wants another referral. Has been taking the Lisinopril inconsistently, 3x a week. Not measuring his BP at home. Diet consists of boiled eggs for breakfast, burger for lunch, and cauliflower and broccoli with chicken. Reduced salt intake. Walks a lot for work, but no other formal exercise.     Sinus Problem  Associated symptoms include congestion, coughing (dry cough), shortness of breath (has asthma, takes albulterol as needed) and a sore throat. Pertinent  "negatives include no ear pain.   Week and half. Wakes him up from sleep because of cough and runny nose. Take DayQuil 2-3 times a day. Provides some relief but still feel congested.      Review of Systems   Constitutional:  Negative for fever.   HENT:  Positive for congestion, rhinorrhea and sore throat. Negative for ear pain and sinus pain.    Eyes:  Positive for itching (on the right eye possibly due to allergies).   Respiratory:  Positive for cough (dry cough) and shortness of breath (has asthma, takes albulterol as needed).    Cardiovascular:  Negative for chest pain and palpitations.   Gastrointestinal:  Negative for abdominal pain, constipation, diarrhea, nausea and vomiting.   Genitourinary:  Negative for difficulty urinating.   Musculoskeletal:  Positive for back pain (occasional back pain).   Neurological:  Negative for dizziness and light-headedness.   Psychiatric/Behavioral:  The patient is not nervous/anxious.         Objective:     /92 (BP Location: Right arm, Patient Position: Sitting, Cuff Size: Standard)   Pulse (!) 110   Temp 98.4 °F (36.9 °C) (Tympanic)   Resp 20   Ht 6' 5\" (1.956 m)   Wt 115 kg (253 lb)   SpO2 97%   BMI 30.00 kg/m²      Physical Exam  Constitutional:       Appearance: Normal appearance. He is obese.   HENT:      Head: Normocephalic and atraumatic.      Right Ear: Tympanic membrane, ear canal and external ear normal.      Left Ear: Tympanic membrane, ear canal and external ear normal.      Nose: Congestion present. No rhinorrhea.      Mouth/Throat:      Mouth: Mucous membranes are moist.      Pharynx: Oropharynx is clear. Posterior oropharyngeal erythema (mild) present. No oropharyngeal exudate.   Eyes:      Extraocular Movements: Extraocular movements intact.      Conjunctiva/sclera: Conjunctivae normal.      Pupils: Pupils are equal, round, and reactive to light.   Cardiovascular:      Rate and Rhythm: Normal rate and regular rhythm.      Heart sounds: Normal heart " sounds. No murmur heard.  Pulmonary:      Effort: Pulmonary effort is normal. No respiratory distress.      Breath sounds: Normal breath sounds. No wheezing, rhonchi or rales.   Abdominal:      General: Abdomen is flat. Bowel sounds are normal.      Palpations: Abdomen is soft. There is no mass.      Tenderness: There is no abdominal tenderness.   Musculoskeletal:         General: Normal range of motion.      Cervical back: Normal range of motion and neck supple.      Right lower leg: No edema.      Left lower leg: No edema.   Skin:     General: Skin is warm.   Neurological:      General: No focal deficit present.      Mental Status: He is alert and oriented to person, place, and time.   Psychiatric:         Mood and Affect: Mood normal.         Behavior: Behavior normal.          ** Please Note: This note may have been constructed using a voice recognition system **     Elaine Coppola DO  04/24/24  12:30 PM

## 2024-04-29 ENCOUNTER — TELEPHONE (OUTPATIENT)
Age: 44
End: 2024-04-29

## 2024-04-29 DIAGNOSIS — R05.1 ACUTE COUGH: ICD-10-CM

## 2024-04-29 DIAGNOSIS — R09.81 SINUS CONGESTION: Primary | ICD-10-CM

## 2024-04-29 RX ORDER — BENZONATATE 100 MG/1
100 CAPSULE ORAL 3 TIMES DAILY PRN
Qty: 30 CAPSULE | Refills: 0 | Status: SHIPPED | OUTPATIENT
Start: 2024-04-29

## 2024-04-29 RX ORDER — PHENYLEPHRINE HCL 10 MG/1
10 TABLET, FILM COATED ORAL EVERY 4 HOURS PRN
Qty: 30 TABLET | Refills: 0 | Status: SHIPPED | OUTPATIENT
Start: 2024-04-29

## 2024-04-29 NOTE — TELEPHONE ENCOUNTER
Dr. Coppola    Patient came back to the office the medication did not work for him and he said you will prescribe another one if this doesn't work  He fill he gets fever on night time.    Please call him or prescribe anything else    [phone]

## 2024-04-30 RX ORDER — PEN NEEDLE, DIABETIC 32GX 5/32"
NEEDLE, DISPOSABLE MISCELLANEOUS
COMMUNITY
Start: 2024-02-29

## 2024-04-30 RX ORDER — LANCETS
EACH MISCELLANEOUS
COMMUNITY
Start: 2024-02-21

## 2024-04-30 RX ORDER — INSULIN GLARGINE-YFGN 100 [IU]/ML
INJECTION, SOLUTION SUBCUTANEOUS
COMMUNITY
Start: 2024-03-30

## 2024-04-30 RX ORDER — BLOOD-GLUCOSE METER
EACH MISCELLANEOUS 2 TIMES DAILY
COMMUNITY
Start: 2024-02-21

## 2024-04-30 RX ORDER — ISOPROPYL ALCOHOL 70 ML/100ML
SWAB TOPICAL 2 TIMES DAILY
COMMUNITY
Start: 2024-02-21

## 2024-05-17 DIAGNOSIS — E11.9 TYPE 2 DIABETES MELLITUS WITHOUT COMPLICATION, WITHOUT LONG-TERM CURRENT USE OF INSULIN (HCC): ICD-10-CM

## 2024-05-17 RX ORDER — EMPAGLIFLOZIN 10 MG/1
10 TABLET, FILM COATED ORAL EVERY MORNING
Qty: 30 TABLET | Refills: 0 | Status: SHIPPED | OUTPATIENT
Start: 2024-05-17

## 2024-06-04 ENCOUNTER — TELEPHONE (OUTPATIENT)
Dept: SURGERY | Facility: CLINIC | Age: 44
End: 2024-06-04

## 2024-06-15 DIAGNOSIS — E11.9 TYPE 2 DIABETES MELLITUS WITHOUT COMPLICATION, WITHOUT LONG-TERM CURRENT USE OF INSULIN (HCC): ICD-10-CM

## 2024-06-15 DIAGNOSIS — Z00.00 ANNUAL PHYSICAL EXAM: ICD-10-CM

## 2024-06-17 RX ORDER — PANTOPRAZOLE SODIUM 20 MG/1
20 TABLET, DELAYED RELEASE ORAL DAILY
Qty: 30 TABLET | Refills: 2 | Status: SHIPPED | OUTPATIENT
Start: 2024-06-17

## 2024-06-17 RX ORDER — EMPAGLIFLOZIN 10 MG/1
10 TABLET, FILM COATED ORAL EVERY MORNING
Qty: 30 TABLET | Refills: 0 | Status: SHIPPED | OUTPATIENT
Start: 2024-06-17

## 2024-07-01 DIAGNOSIS — Z79.4 TYPE 2 DIABETES MELLITUS WITHOUT COMPLICATION, WITH LONG-TERM CURRENT USE OF INSULIN (HCC): Primary | ICD-10-CM

## 2024-07-01 DIAGNOSIS — E11.9 TYPE 2 DIABETES MELLITUS WITHOUT COMPLICATION, WITH LONG-TERM CURRENT USE OF INSULIN (HCC): Primary | ICD-10-CM

## 2024-07-01 RX ORDER — INSULIN GLARGINE 100 [IU]/ML
10 INJECTION, SOLUTION SUBCUTANEOUS DAILY
Qty: 100 ML | Refills: 0 | Status: SHIPPED | OUTPATIENT
Start: 2024-07-01

## 2024-07-18 DIAGNOSIS — E11.9 TYPE 2 DIABETES MELLITUS WITHOUT COMPLICATION, WITHOUT LONG-TERM CURRENT USE OF INSULIN (HCC): ICD-10-CM

## 2024-07-18 RX ORDER — EMPAGLIFLOZIN 10 MG/1
10 TABLET, FILM COATED ORAL EVERY MORNING
Qty: 30 TABLET | Refills: 0 | Status: SHIPPED | OUTPATIENT
Start: 2024-07-18

## 2024-07-31 ENCOUNTER — OFFICE VISIT (OUTPATIENT)
Age: 44
End: 2024-07-31

## 2024-07-31 DIAGNOSIS — L30.9 ECZEMA, UNSPECIFIED TYPE: ICD-10-CM

## 2024-07-31 DIAGNOSIS — J45.40 MODERATE PERSISTENT ASTHMA, UNSPECIFIED WHETHER COMPLICATED: Primary | ICD-10-CM

## 2024-07-31 DIAGNOSIS — E11.9 TYPE 2 DIABETES MELLITUS WITHOUT COMPLICATION, WITH LONG-TERM CURRENT USE OF INSULIN (HCC): ICD-10-CM

## 2024-07-31 DIAGNOSIS — I10 PRIMARY HYPERTENSION: ICD-10-CM

## 2024-07-31 DIAGNOSIS — Z79.4 TYPE 2 DIABETES MELLITUS WITHOUT COMPLICATION, WITH LONG-TERM CURRENT USE OF INSULIN (HCC): ICD-10-CM

## 2024-07-31 LAB — SL AMB POCT HEMOGLOBIN AIC: 5.9 (ref ?–6.5)

## 2024-07-31 PROCEDURE — 83036 HEMOGLOBIN GLYCOSYLATED A1C: CPT | Performed by: FAMILY MEDICINE

## 2024-07-31 PROCEDURE — 99213 OFFICE O/P EST LOW 20 MIN: CPT | Performed by: FAMILY MEDICINE

## 2024-07-31 RX ORDER — LISINOPRIL 10 MG/1
10 TABLET ORAL DAILY
Qty: 30 TABLET | Refills: 2 | Status: SHIPPED | OUTPATIENT
Start: 2024-07-31

## 2024-07-31 RX ORDER — ALBUTEROL SULFATE 2.5 MG/3ML
2.5 SOLUTION RESPIRATORY (INHALATION) EVERY 6 HOURS PRN
Qty: 180 ML | Refills: 0 | Status: SHIPPED | OUTPATIENT
Start: 2024-07-31

## 2024-07-31 RX ORDER — INSULIN GLARGINE 100 [IU]/ML
10 INJECTION, SOLUTION SUBCUTANEOUS DAILY
Qty: 3 ML | Refills: 0 | Status: SHIPPED | OUTPATIENT
Start: 2024-07-31

## 2024-07-31 RX ORDER — ALBUTEROL SULFATE 90 UG/1
2 AEROSOL, METERED RESPIRATORY (INHALATION) EVERY 6 HOURS PRN
Qty: 6.7 G | Refills: 0 | Status: SHIPPED | OUTPATIENT
Start: 2024-07-31

## 2024-07-31 NOTE — PROGRESS NOTES
[facility] Office Visit    Patient Name: Jared Mcghee  MRN: [id number]    Assessment/Plan:     1. Moderate persistent asthma, unspecified whether complicated  Assessment & Plan:  Asthma moderately controlled with albuterol PRN, however notes increased wheezing   Denies issues with exertion or night time awakenings     Continue albuterol inhaler PRN   Will send refill for nebulized albuterol as patient states that helps   PFT for further investigation   Orders:  -     albuterol (Proventil HFA) 90 mcg/act inhaler; Inhale 2 puffs every 6 (six) hours as needed for wheezing or shortness of breath  -     albuterol (2.5 mg/3 mL) 0.083 % nebulizer solution; Take 3 mL (2.5 mg total) by nebulization every 6 (six) hours as needed for wheezing or shortness of breath  -     Complete PFT with post bronchodilator; Future  2. Primary hypertension  Assessment & Plan:  Diagnosed HTN but does not take any of his medications. Has tried lifestyle modifications of diet/exercise but still no improvement to BP     Educated patient on importance of medication compliance   Discussed risks of not taking medications and continued elevated BP   Will restart lisinopril 10 mg   BP log   Follow up in 2 weeks   Orders:  -     lisinopril (ZESTRIL) 10 mg tablet; Take 1 tablet (10 mg total) by mouth daily  3. Type 2 diabetes mellitus without complication, with long-term current use of insulin ([facility])  Assessment & Plan:  Compliant with diabetes medication regimen with recent improvement in A1c   Lab Results   Component Value Date    HGBA1C 5.9 07/31/2024    HGBA1C 10.8 (A) 03/18/2024     Monitor morning glucose for next 2 weeks; if consistently less than 120, consider reducing insulin dose   Continue Jardiance   Follow up in 2 weeks   Orders:  -     Insulin Glargine Solostar (Lantus SoloStar) 100 UNIT/ML SOPN; Inject 0.1 mL (10 Units total) under the skin daily  -     Empagliflozin (Jardiance) 10 MG TABS tablet; Take 1 tablet (10 mg  total) by mouth every morning  -     POCT hemoglobin A1c  4. Eczema, unspecified type  Assessment & Plan:  Eczema flare on right hand/fingers. Reports relief with Kenalog topical in the past     Will send Kenalog ointment  Can also use OTC antihistamine like Allegra for anti-itch relief   Continue to monitor   Orders:  -     triamcinolone (KENALOG) 0.1 % ointment; Apply topically 3 (three) times a day To affected area        Return in about 2 weeks (around 8/14/2024) for BP and blood sugar follow up, to bring logs.     Subjective:   CANDI Mcghee is a 43 y.o. male who presents to follow up on his chronic problems. He is compliant with his diabetes medications (Lantus and Jardiance).   Patient reports that he has been using albuterol more often and feels like he is wheezing. He states that he feels fine when he is going to the gym. Patient states he does not like using the albuterol because it makes him sleepy. He used the inhaler 2x last week. He also has a nebulizer at home and is requesting more solution.   Patient has not been taking lisinopril for hypertension. BP was 156/100 on arrival today.   He would also like something for the eczema flare on the dorsal surface of right hand.        Review of Systems   Constitutional:  Negative for appetite change, chills, fatigue and fever.   HENT:  Negative for congestion, rhinorrhea and sore throat.    Eyes:  Negative for pain, redness and itching.   Respiratory:  Positive for wheezing (intermittent). Negative for cough and shortness of breath.    Cardiovascular:  Negative for chest pain and palpitations.   Gastrointestinal:  Negative for abdominal pain, constipation, diarrhea, nausea and vomiting.   Genitourinary:  Negative for difficulty urinating, dysuria and hematuria.   Musculoskeletal:  Negative for arthralgias, back pain and myalgias.   Skin:  Positive for rash (right hand dorsal surface). Negative for color change and pallor.   Neurological:  Negative for  "dizziness, light-headedness and headaches.   All other systems reviewed and are negative.       Objective:     /96   Pulse 104   Temp 97.5 °F (36.4 °C)   Ht 6' 5\" (1.956 m)   Wt 116 kg (256 lb 8 oz)   SpO2 95%   BMI 30.42 kg/m²      Physical Exam  Vitals reviewed.   Constitutional:       General: He is not in acute distress.     Appearance: He is obese.   HENT:      Head: Normocephalic and atraumatic.      Nose: Nose normal.      Mouth/Throat:      Mouth: Mucous membranes are moist.   Eyes:      Extraocular Movements: Extraocular movements intact.      Conjunctiva/sclera: Conjunctivae normal.   Cardiovascular:      Rate and Rhythm: Normal rate and regular rhythm.      Heart sounds: Normal heart sounds. No murmur heard.  Pulmonary:      Effort: Pulmonary effort is normal. No respiratory distress.      Breath sounds: Normal breath sounds.   Abdominal:      General: Abdomen is flat. Bowel sounds are normal.      Palpations: Abdomen is soft.   Musculoskeletal:      Cervical back: Normal range of motion and neck supple.      Right lower leg: No edema.      Left lower leg: No edema.   Skin:     General: Skin is warm and dry.   Neurological:      General: No focal deficit present.      Mental Status: He is alert and oriented to person, place, and time.   Psychiatric:         Mood and Affect: Mood normal.         Behavior: Behavior normal.          ** Please Note: This note may have been constructed using a voice recognition system **     Elaine Coppola DO  07/31/24  10:18 AM    "

## 2024-08-04 VITALS
HEIGHT: 77 IN | DIASTOLIC BLOOD PRESSURE: 96 MMHG | HEART RATE: 104 BPM | SYSTOLIC BLOOD PRESSURE: 144 MMHG | OXYGEN SATURATION: 95 % | TEMPERATURE: 97.5 F | BODY MASS INDEX: 30.29 KG/M2 | WEIGHT: 256.5 LBS

## 2024-08-04 PROBLEM — Z79.4 TYPE 2 DIABETES MELLITUS WITHOUT COMPLICATION, WITH LONG-TERM CURRENT USE OF INSULIN (HCC): Status: ACTIVE | Noted: 2024-03-13

## 2024-08-04 RX ORDER — TRIAMCINOLONE ACETONIDE 1 MG/G
OINTMENT TOPICAL 3 TIMES DAILY
Qty: 15 G | Refills: 1 | Status: SHIPPED | OUTPATIENT
Start: 2024-08-04

## 2024-08-04 NOTE — ASSESSMENT & PLAN NOTE
Asthma moderately controlled with albuterol PRN, however notes increased wheezing   Denies issues with exertion or night time awakenings     Continue albuterol inhaler PRN   Will send refill for nebulized albuterol as patient states that helps   PFT for further investigation

## 2024-08-04 NOTE — ASSESSMENT & PLAN NOTE
Eczema flare on right hand/fingers. Reports relief with Kenalog topical in the past     Will send Kenalog ointment  Can also use OTC antihistamine like Allegra for anti-itch relief   Continue to monitor

## 2024-08-04 NOTE — ASSESSMENT & PLAN NOTE
Compliant with diabetes medication regimen with recent improvement in A1c   Lab Results   Component Value Date    HGBA1C 5.9 07/31/2024    HGBA1C 10.8 (A) 03/18/2024     Monitor morning glucose for next 2 weeks; if consistently less than 120, consider reducing insulin dose   Continue Jardiance   Follow up in 2 weeks

## 2024-08-15 ENCOUNTER — OFFICE VISIT (OUTPATIENT)
Age: 44
End: 2024-08-15

## 2024-08-15 VITALS
DIASTOLIC BLOOD PRESSURE: 82 MMHG | OXYGEN SATURATION: 97 % | HEIGHT: 77 IN | BODY MASS INDEX: 30.18 KG/M2 | SYSTOLIC BLOOD PRESSURE: 146 MMHG | HEART RATE: 98 BPM | TEMPERATURE: 98 F | WEIGHT: 255.6 LBS

## 2024-08-15 DIAGNOSIS — I10 PRIMARY HYPERTENSION: Primary | ICD-10-CM

## 2024-08-15 DIAGNOSIS — Z79.4 TYPE 2 DIABETES MELLITUS WITHOUT COMPLICATION, WITH LONG-TERM CURRENT USE OF INSULIN (HCC): ICD-10-CM

## 2024-08-15 DIAGNOSIS — E11.9 TYPE 2 DIABETES MELLITUS WITHOUT COMPLICATION, WITH LONG-TERM CURRENT USE OF INSULIN (HCC): ICD-10-CM

## 2024-08-15 PROCEDURE — 99213 OFFICE O/P EST LOW 20 MIN: CPT | Performed by: FAMILY MEDICINE

## 2024-08-15 NOTE — PATIENT INSTRUCTIONS
Continue checking your blood pressure at home, but now check it 1 hour after taking BP medicine (lisinopril)   Reduce alcohol intake and drink plenty of water when you are drinking alcohol   Reduce your exposure to second hand smoke and do not smoke cigarettes/other tobacco products   Send BP logs [facility] Dr[facility] Coppola [facility] or [facility] to [facility]) in another 2 weeks. If BP is still high, we can try increasing the dose of lisinopril.

## 2024-08-15 NOTE — ASSESSMENT & PLAN NOTE
Morning blood sugar averaging 90-100s.   Currently on Jardiance and Lantus 10 units at night   Lab Results   Component Value Date    HGBA1C 5.9 07/31/2024     Continue Jardiance  Reduce Lantus from 10 units at night to 5 units   Continue to check blood sugar in the mornings and keep a log   Follow up in 1 month

## 2024-08-15 NOTE — ASSESSMENT & PLAN NOTE
Has been taking lisinopril 10 mg daily for past 2 weeks. BP log still averaging 140-150/90-100s. Patient has been checking BP in the AM, but not after taking lisinopril     Continue lisinopril 10 mg daily.   Continue checking your blood pressure at home, but now check it 1 hour after taking BP medicine (lisinopril)   Reduce alcohol intake and drink plenty of water when you are drinking alcohol   Reduce your exposure to second hand smoke and do not smoke cigarettes/other tobacco products   [facility] in another 2 weeks. If BP is still high, we can try increasing the dose of lisinopril.

## 2024-08-15 NOTE — PROGRESS NOTES
[facility] Office Visit    Patient Name: Jared Mcghee  MRN: [id number]    Assessment/Plan:     1. Primary hypertension  Assessment & Plan:  Has been taking lisinopril 10 mg daily for past 2 weeks. BP log still averaging 140-150/90-100s. Patient has been checking BP in the AM, but not after taking lisinopril     Continue lisinopril 10 mg daily.   Continue checking your blood pressure at home, but now check it 1 hour after taking BP medicine (lisinopril)   Reduce alcohol intake and drink plenty of water when you are drinking alcohol   Reduce your exposure to second hand smoke and do not smoke cigarettes/other tobacco products   Send BP logs to Dr. Coppola (either by MyChart or bringing to office) in another 2 weeks. If BP is still high, we can try increasing the dose of lisinopril.   2. Type 2 diabetes mellitus without complication, with long-term current use of insulin (Formerly [facility])  Assessment & Plan:  Morning blood sugar averaging 90-100s.   Currently on Jardiance and Lantus 10 units at night   Lab Results   Component Value Date    HGBA1C 5.9 07/31/2024     Continue Jardiance  Reduce Lantus from 10 units at night to 5 units   Continue to check blood sugar in the mornings and keep a log   Follow up in 1 month         Return in about 4 weeks (around 9/12/2024) for BP follow up .     Subjective:   CANDI Mcghee is a 43 y.o. male who presents to follow up on his blood pressure and diabetes.   Patient was started on lisinopril 10 mg visit.  He states he has been taking it every day and recording his blood pressure.  Blood pressure log shows average 140s to 150s over 90s to 100s.  Patient states that he checks his blood pressure in the morning, but not after taking his medicine.  He also notes that his blood pressure increases when he drinks alcohol.  He states that he does not drink alcohol often, but sometimes on the weekends will indulge.  Patient also presents blood sugar logs, averaging 90s to 100s  "for the fasting morning levels.  He denies any symptoms of hypoglycemia including headaches, dizziness, nausea, vomiting.         Review of Systems   Constitutional:  Negative for appetite change, chills and fatigue.   HENT:  Negative for congestion, sinus pressure and sinus pain.    Eyes:  Negative for pain, redness and itching.   Respiratory:  Negative for cough, chest tightness and shortness of breath.    Cardiovascular:  Negative for chest pain and palpitations.   Gastrointestinal:  Negative for abdominal pain, constipation, diarrhea, nausea and vomiting.   Genitourinary:  Negative for difficulty urinating, dysuria and hematuria.   Musculoskeletal:  Negative for arthralgias, back pain and myalgias.   Skin:  Negative for color change, pallor and rash.   Neurological:  Negative for dizziness, light-headedness and headaches.   All other systems reviewed and are negative.       Objective:     /82 (BP Location: Left arm, Patient Position: Sitting, Cuff Size: Large)   Pulse 98   Temp 98 °F (36.7 °C) (Tympanic)   Ht 6' 5\" (1.956 m)   Wt 116 kg (255 lb 9.6 oz)   SpO2 97%   BMI 30.31 kg/m²      Physical Exam  Constitutional:       General: He is not in acute distress.     Appearance: He is obese.   HENT:      Head: Normocephalic and atraumatic.      Mouth/Throat:      Mouth: Mucous membranes are moist.   Eyes:      Extraocular Movements: Extraocular movements intact.      Conjunctiva/sclera: Conjunctivae normal.   Cardiovascular:      Rate and Rhythm: Normal rate and regular rhythm.      Heart sounds: Normal heart sounds. No murmur heard.  Pulmonary:      Effort: Pulmonary effort is normal. No respiratory distress.      Breath sounds: Normal breath sounds.   Abdominal:      General: Abdomen is flat. Bowel sounds are normal.      Palpations: Abdomen is soft. There is no mass.      Tenderness: There is no abdominal tenderness.   Musculoskeletal:         General: Normal range of motion.      Cervical back: " Normal range of motion.      Right lower leg: No edema.      Left lower leg: No edema.   Skin:     General: Skin is warm.   Neurological:      Mental Status: He is alert and oriented to person, place, and time.   Psychiatric:         Mood and Affect: Mood normal.         Behavior: Behavior normal.          ** Please Note: This note may have been constructed using a voice recognition system **     Elaine Coppola DO  08/15/24  11:23 AM

## 2024-09-16 DIAGNOSIS — E11.9 TYPE 2 DIABETES MELLITUS WITHOUT COMPLICATION, WITH LONG-TERM CURRENT USE OF INSULIN (HCC): ICD-10-CM

## 2024-09-16 DIAGNOSIS — Z79.4 TYPE 2 DIABETES MELLITUS WITHOUT COMPLICATION, WITH LONG-TERM CURRENT USE OF INSULIN (HCC): ICD-10-CM

## 2024-09-16 RX ORDER — PEN NEEDLE, DIABETIC 32GX 5/32"
NEEDLE, DISPOSABLE MISCELLANEOUS
Qty: 30 EACH | Refills: 2 | Status: SHIPPED | OUTPATIENT
Start: 2024-09-16

## 2024-09-16 RX ORDER — INSULIN GLARGINE 100 [IU]/ML
10 INJECTION, SOLUTION SUBCUTANEOUS DAILY
Qty: 3 ML | Refills: 2 | Status: SHIPPED | OUTPATIENT
Start: 2024-09-16

## 2024-09-16 NOTE — TELEPHONE ENCOUNTER
Confirmed pharmacy   Advised refill request can take 48-72 hrs  Patient stated that he only has enough insulin for today

## 2024-10-09 ENCOUNTER — HOSPITAL ENCOUNTER (EMERGENCY)
Facility: HOSPITAL | Age: 44
Discharge: HOME | End: 2024-10-09
Attending: EMERGENCY MEDICINE
Payer: COMMERCIAL

## 2024-10-09 VITALS
RESPIRATION RATE: 18 BRPM | TEMPERATURE: 98.5 F | DIASTOLIC BLOOD PRESSURE: 125 MMHG | HEART RATE: 115 BPM | SYSTOLIC BLOOD PRESSURE: 175 MMHG | OXYGEN SATURATION: 96 %

## 2024-10-09 DIAGNOSIS — Z00.8 ENCOUNTER FOR MEDICAL ASSESSMENT: Primary | ICD-10-CM

## 2024-10-09 LAB
ETHANOL EXG-MCNC: 0 MG/DL
GLUCOSE SERPL-MCNC: 118 MG/DL (ref 65–140)

## 2024-10-09 PROCEDURE — 99284 EMERGENCY DEPT VISIT MOD MDM: CPT | Performed by: EMERGENCY MEDICINE

## 2024-10-09 PROCEDURE — 82948 REAGENT STRIP/BLOOD GLUCOSE: CPT

## 2024-10-09 PROCEDURE — 99283 EMERGENCY DEPT VISIT LOW MDM: CPT

## 2024-10-09 PROCEDURE — 82075 ASSAY OF BREATH ETHANOL: CPT | Performed by: EMERGENCY MEDICINE

## 2024-10-09 NOTE — Clinical Note
Jared Mcghee was seen and treated in our emergency department on 10/9/2024.                Diagnosis: Normal physical exam and lab workup.    Jared  .    He may return on this date: 10/09/2024         If you have any questions or concerns, please don't hesitate to call.      Oc Huitron MD    ______________________________           _______________          _______________  Hospital Representative                              Date                                Time

## 2024-10-09 NOTE — ED PROVIDER NOTES
"Final diagnoses:   Encounter for medical assessment     ED Disposition       ED Disposition   Discharge    Condition   Stable    Date/Time   Wed Oct 9, 2024  2:38 AM    Comment   Jared Mcghee discharge to home/self care.                   Assessment & Plan       Medical Decision Making  43-year-old male presenting to the ED today for breathalyzer test because his work thinks he is under the influence.  At this time I do not think he is under the influence and I think that he is clinically sober on exam.  Will do a breathalyzer test that he can return to work.  Will also check a point-of-care glucose given his diabetes history to make sure that he does not and HHNK/DKA which could be causing him to be \"altered\" however I think that this is very unlikely given his well-appearing and well mentating appearance here.    Amount and/or Complexity of Data Reviewed  Labs: ordered.             Medications - No data to display    ED Risk Strat Scores                           SBIRT 22yo+      Flowsheet Row Most Recent Value   Initial Alcohol Screen: US AUDIT-C     1. How often do you have a drink containing alcohol? 1 Filed at: 10/09/2024 0236   2. How many drinks containing alcohol do you have on a typical day you are drinking?  0 Filed at: 10/09/2024 0236   3a. Male UNDER 65: How often do you have five or more drinks on one occasion? 0 Filed at: 10/09/2024 0236   3b. FEMALE Any Age, or MALE 65+: How often do you have 4 or more drinks on one occassion? 0 Filed at: 10/09/2024 0236   Audit-C Score 1 Filed at: 10/09/2024 0236   DEANDRE: How many times in the past year have you...    Used an illegal drug or used a prescription medication for non-medical reasons? Never Filed at: 10/09/2024 0236                            History of Present Illness       Chief Complaint   Patient presents with    Drug / Alcohol Assessment     Pt sent here by workplace for breathalyzer test       Past Medical History:   Diagnosis Date    Asthma     " Diabetes mellitus (HCC)     Eczema     GI problem       Past Surgical History:   Procedure Laterality Date    CHOLECYSTECTOMY      CHOLECYSTECTOMY LAPAROSCOPIC N/A 10/7/2016    Procedure: CHOLECYSTECTOMY LAPAROSCOPIC, POSS. OPEN;  Surgeon: Nick Rueda MD;  Location: [facility];  Service:     NO PAST SURGERIES        History reviewed. No pertinent family history.   Social History     Tobacco Use    Smoking status: Former     Current packs/day: 0.00     Types: Cigars, Cigarettes     Quit date: 10/5/2015     Years since quittin.0     Passive exposure: Past    Smokeless tobacco: Never   Vaping Use    Vaping status: Never Used   Substance Use Topics    Alcohol use: Yes     Alcohol/week: 1.0 standard drink of alcohol     Types: 1 Cans of beer per week     Comment: weekends    Drug use: No      E-Cigarette/Vaping    E-Cigarette Use Never User       E-Cigarette/Vaping Substances    Nicotine No     THC No     CBD No     Flavoring No     Other No     Unknown No       I have reviewed and agree with the history as documented.     43-year-old male past medical history significant for type 2 diabetes presenting to ED today.  His job accused him of being under the influence.  Patient appears clinically sober here.  He is requesting breathalyzer test so that he can return to work        Review of Systems   Constitutional:  Negative for chills and fever.   HENT:  Negative for hearing loss.    Eyes:  Negative for visual disturbance.   Respiratory:  Negative for shortness of breath.    Cardiovascular:  Negative for chest pain.   Gastrointestinal:  Negative for abdominal pain, constipation, diarrhea, nausea and vomiting.   Genitourinary:  Negative for difficulty urinating.   Musculoskeletal:  Negative for myalgias.   Skin:  Negative for rash.   Neurological:  Negative for dizziness.   Psychiatric/Behavioral:  Negative for agitation.    All other systems reviewed and are negative.          Objective       ED Triage Vitals    Temperature Pulse Blood Pressure Respirations SpO2 Patient Position - Orthostatic VS   10/09/24 0234 10/09/24 0233 10/09/24 0236 10/09/24 0233 10/09/24 0233 10/09/24 0236   98.5 °F (36.9 °C) (!) 115 (!) 175/125 18 96 % Sitting      Temp Source Heart Rate Source BP Location FiO2 (%) Pain Score    10/09/24 0234 10/09/24 0233 10/09/24 0236 -- --    Oral Monitor Right arm        Vitals      Date and Time Temp Pulse SpO2 Resp BP Pain Score FACES Pain Rating User   10/09/24 0236 -- -- -- -- 175/125 -- -- SHANTEL   10/09/24 0234 98.5 °F (36.9 °C) -- -- -- -- -- -- SHANTEL   10/09/24 0233 -- 115 96 % 18 -- -- -- SHANTEL            Physical Exam  Vitals and nursing note reviewed.   Constitutional:       General: He is not in acute distress.     Appearance: Normal appearance. He is not ill-appearing.   HENT:      Head: Normocephalic and atraumatic.      Right Ear: External ear normal.      Left Ear: External ear normal.      Nose: Nose normal. No congestion.      Mouth/Throat:      Mouth: Mucous membranes are moist.      Pharynx: Oropharynx is clear. No oropharyngeal exudate.   Eyes:      General:         Right eye: No discharge.         Left eye: No discharge.      Extraocular Movements: Extraocular movements intact.      Conjunctiva/sclera: Conjunctivae normal.      Pupils: Pupils are equal, round, and reactive to light.   Cardiovascular:      Rate and Rhythm: Normal rate and regular rhythm.      Pulses: Normal pulses.      Heart sounds: Normal heart sounds.   Pulmonary:      Effort: Pulmonary effort is normal. No respiratory distress.      Breath sounds: Normal breath sounds. No wheezing.   Abdominal:      General: Abdomen is flat. Bowel sounds are normal. There is no distension.      Palpations: Abdomen is soft.      Tenderness: There is no abdominal tenderness.   Musculoskeletal:         General: No swelling or deformity. Normal range of motion.      Cervical back: Normal range of motion. No rigidity.   Skin:     General: Skin is  warm and dry.      Capillary Refill: Capillary refill takes less than 2 seconds.   Neurological:      General: No focal deficit present.      Mental Status: He is alert and oriented to person, place, and time. Mental status is at baseline.      Cranial Nerves: No cranial nerve deficit.      Sensory: No sensory deficit.      Motor: No weakness.      Gait: Gait normal.   Psychiatric:         Mood and Affect: Mood normal.         Behavior: Behavior normal.         Results Reviewed       Procedure Component Value Units Date/Time    POCT alcohol breath test [[id number]]  (Normal) Resulted: 10/09/24 0238    Lab Status: Final result Updated: 10/09/24 0238     EXTBreath Alcohol 0.000    Fingerstick Glucose (POCT) [[id number]]  (Normal) Collected: 10/09/24 0236    Lab Status: Final result Specimen: Blood Updated: 10/09/24 0237     POC Glucose 118 mg/dl             No orders to display       Procedures    ED Medication and Procedure Management   Prior to Admission Medications   Prescriptions Last Dose Informant Patient Reported? Taking?   Blood Glucose Monitoring Suppl (Contour Next EZ) w/Device KIT   Yes No   Si (two) times a day Use as directed   Contour Next Test test strip   No No   Sig: Use as instructed   Droplet Pen Needles 32G X 4 MM MISC   No No   Sig: Use one (1) pen needle to inject medication subcutaneously once daily   Empagliflozin (Jardiance) 10 MG TABS tablet   No No   Sig: Take 1 tablet (10 mg total) by mouth every morning   Insulin Glargine Solostar (Lantus SoloStar) 100 UNIT/ML SOPN   No No   Sig: Inject 0.1 mL (10 Units total) under the skin daily   Insulin Glargine-yfgn 100 UNIT/ML SOPN   Yes No   Sig: INJECT 10 UNITS UNDER THE SKIN AT BEDTIME   Microlet Lancets MISC   Yes No   Sig: use 1 LANCET to TEST BLOOD SUGAR twice a day   RA Alcohol Swabs 70 % PADS   Yes No   Si (two) times a day Use as directed   albuterol (2.5 mg/3 mL) 0.083 % nebulizer solution   No No   Sig: Take 3 mL (2.5 mg total) by  nebulization every 6 (six) hours as needed for wheezing or shortness of breath   albuterol (Proventil HFA) 90 mcg/act inhaler   No No   Sig: Inhale 2 puffs every 6 (six) hours as needed for wheezing or shortness of breath   fluticasone (FLONASE) 50 mcg/act nasal spray   No No   Si spray into each nostril daily   lisinopril (ZESTRIL) 10 mg tablet   No No   Sig: Take 1 tablet (10 mg total) by mouth daily   triamcinolone (KENALOG) 0.1 % ointment   No No   Sig: Apply topically 3 (three) times a day To affected area      Facility-Administered Medications: None     Discharge Medication List as of 10/9/2024  2:39 AM        CONTINUE these medications which have NOT CHANGED    Details   albuterol (2.5 mg/3 mL) 0.083 % nebulizer solution Take 3 mL (2.5 mg total) by nebulization every 6 (six) hours as needed for wheezing or shortness of breath, Starting 2024, Normal      albuterol (Proventil HFA) 90 mcg/act inhaler Inhale 2 puffs every 6 (six) hours as needed for wheezing or shortness of breath, Starting 2024, Normal      Blood Glucose Monitoring Suppl (Contour Next EZ) w/Device KIT 2 (two) times a day Use as directed, Starting 2024, Historical Med      Contour Next Test test strip Use as instructed, Normal      Droplet Pen Needles 32G X 4 MM MISC Use one (1) pen needle to inject medication subcutaneously once daily, Normal      Empagliflozin (Jardiance) 10 MG TABS tablet Take 1 tablet (10 mg total) by mouth every morning, Starting 2024, Normal      fluticasone (FLONASE) 50 mcg/act nasal spray 1 spray into each nostril daily, Starting 2024, Normal      Insulin Glargine Solostar (Lantus SoloStar) 100 UNIT/ML SOPN Inject 0.1 mL (10 Units total) under the skin daily, Starting Mon 2024, Normal      Insulin Glargine-yfgn 100 UNIT/ML SOPN INJECT 10 UNITS UNDER THE SKIN AT BEDTIME, Historical Med      lisinopril (ZESTRIL) 10 mg tablet Take 1 tablet (10 mg total) by mouth daily,  Starting Wed 7/31/2024, Normal      Microlet Lancets MISC use 1 LANCET to TEST BLOOD SUGAR twice a day, Historical Med      RA Alcohol Swabs 70 % PADS 2 (two) times a day Use as directed, Starting Wed 2/21/2024, Historical Med      triamcinolone (KENALOG) 0.1 % ointment Apply topically 3 (three) times a day To affected area, Starting Sun 8/4/2024, Normal           No discharge procedures on file.  ED SEPSIS DOCUMENTATION   Time reflects when diagnosis was documented in both MDM as applicable and the Disposition within this note       Time User Action Codes Description Comment    10/9/2024  2:38 AM Oc Huitron Add [Z00.8] Encounter for medical assessment                  Oc Huitron MD  10/09/24 0246

## 2025-01-07 DIAGNOSIS — Z79.4 TYPE 2 DIABETES MELLITUS WITHOUT COMPLICATION, WITH LONG-TERM CURRENT USE OF INSULIN (HCC): ICD-10-CM

## 2025-01-07 DIAGNOSIS — E11.9 TYPE 2 DIABETES MELLITUS WITHOUT COMPLICATION, WITH LONG-TERM CURRENT USE OF INSULIN (HCC): ICD-10-CM

## 2025-01-07 RX ORDER — PEN NEEDLE, DIABETIC 32GX 5/32"
NEEDLE, DISPOSABLE MISCELLANEOUS
Qty: 30 EACH | Refills: 2 | Status: SHIPPED | OUTPATIENT
Start: 2025-01-07

## 2025-01-23 DIAGNOSIS — Z79.4 TYPE 2 DIABETES MELLITUS WITHOUT COMPLICATION, WITH LONG-TERM CURRENT USE OF INSULIN (HCC): ICD-10-CM

## 2025-01-23 DIAGNOSIS — E11.9 TYPE 2 DIABETES MELLITUS WITHOUT COMPLICATION, WITH LONG-TERM CURRENT USE OF INSULIN (HCC): ICD-10-CM

## 2025-01-24 ENCOUNTER — OFFICE VISIT (OUTPATIENT)
Dept: URGENT CARE | Facility: CLINIC | Age: 45
End: 2025-01-24
Payer: COMMERCIAL

## 2025-01-24 ENCOUNTER — DOCUMENTATION (OUTPATIENT)
Dept: ADMINISTRATIVE | Facility: OTHER | Age: 45
End: 2025-01-24

## 2025-01-24 VITALS
TEMPERATURE: 97.4 F | SYSTOLIC BLOOD PRESSURE: 164 MMHG | RESPIRATION RATE: 20 BRPM | DIASTOLIC BLOOD PRESSURE: 102 MMHG | WEIGHT: 278.6 LBS | HEART RATE: 114 BPM | OXYGEN SATURATION: 97 % | BODY MASS INDEX: 33.04 KG/M2

## 2025-01-24 DIAGNOSIS — R73.9 HIGH BLOOD SUGAR: Primary | ICD-10-CM

## 2025-01-24 LAB — GLUCOSE SERPL-MCNC: 175 MG/DL (ref 65–140)

## 2025-01-24 PROCEDURE — 99213 OFFICE O/P EST LOW 20 MIN: CPT

## 2025-01-24 RX ORDER — PANTOPRAZOLE SODIUM 20 MG/1
TABLET, DELAYED RELEASE ORAL DAILY
COMMUNITY

## 2025-01-24 NOTE — LETTER
January 24, 2025     Patient: Jared Mcghee   YOB: 1980   Date of Visit: 1/24/2025       To Whom it May Concern:    Jared Mcghee was seen in my clinic on 1/24/2025. He may return to work on 1/28/2025 .    If you have any questions or concerns, please don't hesitate to call.         Sincerely,          Chantelle Pack PA-C        CC: No Recipients

## 2025-01-24 NOTE — PROGRESS NOTES
"  [facility]        NAME: Jared Mcghee is a 44 y.o. male  : 1980    MRN: [id number]  DATE: 2025  TIME: 10:05 AM    Assessment and Plan   High blood sugar [R73.9]  1. High blood sugar  Fingerstick Glucose (POCT)         fasting in the office. Discussed red flag signs that would require presenting to the ED, patient reported understanding.     Patient Instructions       Follow up with PCP in 3-5 days.  Proceed to  ER if symptoms worsen.    If tests are performed, our office will contact you with results only if changes need to made to the care plan discussed with you at the visit. You can review your full results on [facility].    Chief Complaint     Chief Complaint   Patient presents with    elevated blood sugars     Reports elevated blood sugar levels the past few days in the \"200's\". Also reports chills and sweating. Has had left side pain for \"months\" that he reports he feels may be due to taking Jardiance. States he is longer taking Jardiance. States he has a primary care appointment on 25 for these concerns and symptoms and is requesting a work note.          History of Present Illness       Patient presents for elevated blood sugar for the last few days. Reports that he has stopped taking his Jardiance and has noticed his blood sugars being in the 200's lately. Also reports intermittent sweats and chills but denies abdominal pain, nausea, vomiting, or disorientation. He has an appointment with his PCP on Monday but needs a note for work today. He also reports stopping some of his blood pressure medication recently.         Review of Systems   Review of Systems   Constitutional:  Positive for chills and diaphoresis. Negative for fever.   HENT:  Negative for congestion, postnasal drip, rhinorrhea, sinus pressure, sore throat and trouble swallowing.    Respiratory:  Negative for cough, chest tightness and shortness of breath.    Cardiovascular:  Negative for chest " pain and palpitations.   Gastrointestinal:  Negative for abdominal pain, nausea and vomiting.   Genitourinary:  Negative for difficulty urinating.   Musculoskeletal:  Negative for myalgias.   Neurological:  Negative for dizziness and headaches.         Current Medications       Current Outpatient Medications:     albuterol (2.5 mg/3 mL) 0.083 % nebulizer solution, Take 3 mL (2.5 mg total) by nebulization every 6 (six) hours as needed for wheezing or shortness of breath, Disp: 180 mL, Rfl: 0    albuterol (Proventil HFA) 90 mcg/act inhaler, Inhale 2 puffs every 6 (six) hours as needed for wheezing or shortness of breath, Disp: 6.7 g, Rfl: 0    Blood Glucose Monitoring Suppl (Contour Next EZ) w/Device KIT, 2 (two) times a day Use as directed, Disp: , Rfl:     Contour Next Test test strip, Use as instructed, Disp: 100 each, Rfl: 2    Droplet Pen Needles 32G X 4 MM MISC, Use one (1) pen needle to inject medication subcutaneously once daily, Disp: 30 each, Rfl: 2    Insulin Glargine Solostar (Lantus SoloStar) 100 UNIT/ML SOPN, Inject 0.1 mL (10 Units total) under the skin daily (Patient taking differently: Inject 0.1 mL (10 Units total) under the skin daily), Disp: 3 mL, Rfl: 2    pantoprazole (PROTONIX) 20 mg tablet, Take by mouth daily, Disp: , Rfl:     triamcinolone (KENALOG) 0.1 % ointment, Apply topically 3 (three) times a day To affected area (Patient taking differently: Apply topically 3 (three) times a day To affected area), Disp: 15 g, Rfl: 1    Empagliflozin (Jardiance) 10 MG TABS tablet, Take 1 tablet (10 mg total) by mouth every morning (Patient not taking: Reported on 1/24/2025), Disp: 30 tablet, Rfl: 5    fluticasone (FLONASE) 50 mcg/act nasal spray, 1 spray into each nostril daily (Patient not taking: Reported on 1/24/2025), Disp: 15.8 mL, Rfl: 1    Insulin Glargine-yfgn 100 UNIT/ML SOPN, INJECT 10 UNITS UNDER THE SKIN AT BEDTIME (Patient not taking: Reported on 1/24/2025), Disp: , Rfl:     lisinopril  (ZESTRIL) 10 mg tablet, Take 1 tablet (10 mg total) by mouth daily (Patient not taking: Reported on 1/24/2025), Disp: 30 tablet, Rfl: 2    Microlet Lancets MISC, use 1 LANCET to TEST BLOOD SUGAR twice a day, Disp: , Rfl:     RA Alcohol Swabs 70 % PADS, 2 (two) times a day Use as directed, Disp: , Rfl:     Current Allergies     Allergies as of 01/24/2025 - Reviewed 01/24/2025   Allergen Reaction Noted    Other Anaphylaxis 10/05/2016    Metformin Headache 10/09/2024    Morphine and codeine Rash 10/05/2016            The following portions of the patient's history were reviewed and updated as appropriate: allergies, current medications, past family history, past medical history, past social history, past surgical history and problem list.     Past Medical History:   Diagnosis Date    Asthma     Diabetes mellitus (HCC)     Eczema     GI problem        Past Surgical History:   Procedure Laterality Date    CHOLECYSTECTOMY      CHOLECYSTECTOMY LAPAROSCOPIC N/A 10/7/2016    Procedure: CHOLECYSTECTOMY LAPAROSCOPIC, POSS. OPEN;  Surgeon: Nick Rueda MD;  Location: [facility];  Service:     NO PAST SURGERIES         No family history on file.      Medications have been verified.        Objective   BP (!) 164/102 Comment: manual recheck  Pulse (!) 114   Temp (!) 97.4 °F (36.3 °C) (Tympanic)   Resp 20   Wt 126 kg (278 lb 9.6 oz)   SpO2 97%   BMI 33.04 kg/m²        Physical Exam     Physical Exam  Constitutional:       General: He is not in acute distress.     Appearance: He is not ill-appearing.   HENT:      Nose: Nose normal.      Mouth/Throat:      Mouth: Mucous membranes are moist.      Pharynx: Oropharynx is clear.   Eyes:      Pupils: Pupils are equal, round, and reactive to light.   Cardiovascular:      Rate and Rhythm: Normal rate and regular rhythm.      Pulses: Normal pulses.      Heart sounds: Normal heart sounds. No murmur heard.     No gallop.   Pulmonary:      Effort: Pulmonary effort is normal. No  respiratory distress.      Breath sounds: Normal breath sounds. No wheezing.   Abdominal:      General: Abdomen is flat. Bowel sounds are normal. There is no distension.      Palpations: Abdomen is soft.      Tenderness: There is no abdominal tenderness. There is no guarding.   Musculoskeletal:         General: Normal range of motion.      Cervical back: Normal range of motion.   Skin:     General: Skin is warm and dry.      Capillary Refill: Capillary refill takes less than 2 seconds.   Neurological:      Mental Status: He is alert and oriented to person, place, and time.

## 2025-01-24 NOTE — PROGRESS NOTES
01/28/25 10:50 AM    Patient was called after the Urgent Care visit Patient has an upcoming appointment with their primary care provider on 1/27/25 to follow on an elevated Blood pressure.      Thank you.  Hiram Geronimo MA  [facility]          Arcelia Vaughn RN  P Patient Reported Team         Blood pressure elevated  Appointment department: [facility]  Appointment provider: Arcelia Vaughn RN  Blood pressure  01/24/25 0941 (!) 164/102  01/24/25 0938 (!) 204/116

## 2025-01-27 ENCOUNTER — OFFICE VISIT (OUTPATIENT)
Age: 45
End: 2025-01-27

## 2025-01-27 VITALS
OXYGEN SATURATION: 96 % | BODY MASS INDEX: 32.86 KG/M2 | HEART RATE: 105 BPM | DIASTOLIC BLOOD PRESSURE: 102 MMHG | SYSTOLIC BLOOD PRESSURE: 158 MMHG | WEIGHT: 278.3 LBS | HEIGHT: 77 IN | TEMPERATURE: 97.9 F

## 2025-01-27 DIAGNOSIS — Z79.4 TYPE 2 DIABETES MELLITUS WITHOUT COMPLICATION, WITH LONG-TERM CURRENT USE OF INSULIN (HCC): Primary | ICD-10-CM

## 2025-01-27 DIAGNOSIS — I10 PRIMARY HYPERTENSION: ICD-10-CM

## 2025-01-27 DIAGNOSIS — E11.9 TYPE 2 DIABETES MELLITUS WITHOUT COMPLICATION, WITH LONG-TERM CURRENT USE OF INSULIN (HCC): Primary | ICD-10-CM

## 2025-01-27 DIAGNOSIS — K21.9 GASTROESOPHAGEAL REFLUX DISEASE WITHOUT ESOPHAGITIS: ICD-10-CM

## 2025-01-27 PROCEDURE — 99214 OFFICE O/P EST MOD 30 MIN: CPT | Performed by: FAMILY MEDICINE

## 2025-01-27 RX ORDER — LOSARTAN POTASSIUM 25 MG/1
25 TABLET ORAL DAILY
Qty: 30 TABLET | Refills: 1 | Status: SHIPPED | OUTPATIENT
Start: 2025-01-27 | End: 2025-02-07 | Stop reason: SDUPTHER

## 2025-01-27 RX ORDER — PANTOPRAZOLE SODIUM 20 MG/1
20 TABLET, DELAYED RELEASE ORAL DAILY
Qty: 30 TABLET | Refills: 1 | Status: SHIPPED | OUTPATIENT
Start: 2025-01-27

## 2025-01-27 NOTE — PROGRESS NOTES
Name: Jared Mcghee      : 1980      MRN: [id number]  Encounter Provider: Elaine Coppola DO  Encounter Date: 2025   Encounter department: [facility]  :  Assessment & Plan  Type 2 diabetes mellitus without complication, with long-term current use of insulin (HCC)    Lab Results   Component Value Date    HGBA1C 5.9 2024    HGBA1C 10.8 (A) 2024     Improvement of A1c with Lantus. Recently decreased from 10 units at night to 5 units at night.   Was also on Jardiance for dual diabetic and cardiac benefits, however stopped taking it as he thought it was making him light-headed   Blood sugars at home usually range around 150s fasting in the morning     Due to rapid improvement of A1c in a 4-month period, lightheadedness may be due to hypoglycemia from insulin  At this time we will continue current regimen.  Patient to follow-up in 2 weeks for repeat A1c  If A1c remains the same or continues to decrease, consider discontinuing Lantus and resuming Jardiance for diabetes control.  Jardiance also has dual cardiac benefit as patient has elevated CAD risk  Patient would also benefit from statin therapy due to intermediate ASCVD risk.   Most recent lipid panel from 2024 with . Ideal LDL <70 for patients with diabetes and cardiovascular disease.   Can also consider CGM for ease of glucose monitoring          Primary hypertension  Patient with elevated BP today of 150/108.  Repeat by manual was 158/102.  Previously prescribed lisinopril 10 mg daily, however patient was not taking it as he felt it made him lightheaded    Educated patient on importance of blood pressure control due to elevated CAD risk  We will start on losartan 25 mg daily. Added benefit as patient has history of asthma  Monitor BP at home and keep a log. Follow up in 2 weeks, bring log to appointment.    Patient with intermediate ASCVD risk.  Would benefit from statin therapy, especially  "in light of comorbid diabetes diagnosis. Also discussed CT coronary calcium score with patient today.    Patient states he would like to hold off on both for now.   Will rediscuss at follow-up visit in 2 weeks.  Orders:    losartan (COZAAR) 25 mg tablet; Take 1 tablet (25 mg total) by mouth daily           History of Present Illness   Patient presents to follow-up on his diabetes and blood pressure.  States that he stopped taking his Jardiance and his lisinopril because he was feeling \"funny\" and lightheaded.  Reports that he did not know which medication was causing the lightheadedness so he stopped them both.  Also reports that he has pain on the left side of his abdomen for the past few months, intermittently.  States that the pain sometimes feels as if it is stabbing.  Notes that he does alternate the sides in which she gives himself insulin injections.    Diabetes  He presents for his follow-up diabetic visit. He has type 2 diabetes mellitus. Pertinent negatives for hypoglycemia include no dizziness or headaches. Pertinent negatives for diabetes include no chest pain. Risk factors for coronary artery disease include diabetes mellitus, hypertension, stress and tobacco exposure. Current diabetic treatment includes insulin injections. His weight is stable. He is following a generally healthy diet. He has not had a previous visit with a dietitian. His home blood glucose trend is fluctuating minimally. His breakfast blood glucose range is generally 140-180 mg/dl. An ACE inhibitor/angiotensin II receptor blocker is not being taken (non-compliant).     Review of Systems   Constitutional:  Negative for chills and fever.   HENT:  Negative for ear pain and sore throat.    Eyes:  Negative for pain and visual disturbance.   Respiratory:  Negative for cough and shortness of breath.    Cardiovascular:  Negative for chest pain and palpitations.   Gastrointestinal:  Positive for abdominal pain. Negative for constipation, " "diarrhea, nausea and vomiting.   Genitourinary:  Negative for dysuria and hematuria.   Musculoskeletal:  Negative for arthralgias and back pain.   Skin:  Negative for color change and rash.   Neurological:  Positive for light-headedness. Negative for dizziness and headaches.   All other systems reviewed and are negative.      Objective   BP (!) 150/108 (BP Location: Left arm, Patient Position: Sitting, Cuff Size: Large)   Pulse 105   Temp 97.9 °F (36.6 °C) (Tympanic)   Ht 6' 5\" (1.956 m)   Wt 126 kg (278 lb 4.8 oz)   SpO2 96%   BMI 33.00 kg/m²      Physical Exam  Constitutional:       General: He is not in acute distress.     Appearance: He is obese.   HENT:      Head: Normocephalic and atraumatic.      Nose: Nose normal.      Mouth/Throat:      Mouth: Mucous membranes are moist.   Eyes:      General: No scleral icterus.     Extraocular Movements: Extraocular movements intact.      Conjunctiva/sclera: Conjunctivae normal.   Cardiovascular:      Rate and Rhythm: Normal rate and regular rhythm.      Pulses: no weak pulses.           Dorsalis pedis pulses are 2+ on the right side and 2+ on the left side.        Posterior tibial pulses are 2+ on the right side and 2+ on the left side.      Heart sounds: Normal heart sounds. No murmur heard.  Pulmonary:      Effort: Pulmonary effort is normal. No respiratory distress.      Breath sounds: Normal breath sounds.   Abdominal:      General: Bowel sounds are normal.      Palpations: Abdomen is soft. There is no mass.      Tenderness: There is no abdominal tenderness.   Musculoskeletal:         General: Normal range of motion.      Cervical back: Normal range of motion.      Right lower leg: No edema.      Left lower leg: No edema.   Feet:      Right foot:      Skin integrity: No ulcer, skin breakdown, erythema, warmth, callus or dry skin.      Left foot:      Skin integrity: No ulcer, skin breakdown, erythema, warmth, callus or dry skin.   Skin:     General: Skin is " warm.   Neurological:      General: No focal deficit present.      Mental Status: He is alert and oriented to person, place, and time.   Psychiatric:         Mood and Affect: Mood normal.         Behavior: Behavior normal.       Diabetic Foot Exam    Patient's shoes and socks removed.    Right Foot/Ankle   Right Foot Inspection  Skin Exam: skin normal and skin intact. No dry skin, no warmth, no callus, no erythema, no maceration, no abnormal color, no pre-ulcer, no ulcer and no callus.     Toe Exam: ROM and strength within normal limits.     Sensory   Proprioception: intact  Monofilament testing: intact    Vascular  Capillary refills: < 3 seconds  The right DP pulse is 2+. The right PT pulse is 2+.     Left Foot/Ankle  Left Foot Inspection  Skin Exam: skin normal and skin intact. No dry skin, no warmth, no erythema, no maceration, normal color, no pre-ulcer, no ulcer and no callus.     Toe Exam: ROM and strength within normal limits.     Sensory   Proprioception: intact  Monofilament testing: intact    Vascular  Capillary refills: < 3 seconds  The left DP pulse is 2+. The left PT pulse is 2+.     Assign Risk Category  No deformity present  No loss of protective sensation  No weak pulses  Risk: 0

## 2025-01-27 NOTE — ASSESSMENT & PLAN NOTE
Lab Results   Component Value Date    HGBA1C 5.9 07/31/2024    HGBA1C 10.8 (A) 03/18/2024     Improvement of A1c with Lantus. Recently decreased from 10 units at night to 5 units at night.   Was also on Jardiance for dual diabetic and cardiac benefits, however stopped taking it as he thought it was making him light-headed   Blood sugars at home usually range around 150s fasting in the morning     Due to rapid improvement of A1c in a 4-month period, lightheadedness may be due to hypoglycemia from insulin  At this time we will continue current regimen.  Patient to follow-up in 2 weeks for repeat A1c  If A1c remains the same or continues to decrease, consider discontinuing Lantus and resuming Jardiance for diabetes control.  Jardiance also has dual cardiac benefit as patient has elevated CAD risk  Patient would also benefit from statin therapy due to intermediate ASCVD risk.   Most recent lipid panel from 03/25/2024 with . Ideal LDL <70 for patients with diabetes and cardiovascular disease.   Can also consider CGM for ease of glucose monitoring

## 2025-01-27 NOTE — ASSESSMENT & PLAN NOTE
Patient with elevated BP today of 150/108.  Repeat by manual was 158/102.  Previously prescribed lisinopril 10 mg daily, however patient was not taking it as he felt it made him lightheaded    Educated patient on importance of blood pressure control due to elevated CAD risk  We will start on losartan 25 mg daily. Added benefit as patient has history of asthma  Monitor BP at home and keep a log. Follow up in 2 weeks, bring log to appointment.    Patient with intermediate ASCVD risk.  Would benefit from statin therapy, especially in light of comorbid diabetes diagnosis. Also discussed CT coronary calcium score with patient today.    Patient states he would like to hold off on both for now.   Will rediscuss at follow-up visit in 2 weeks.  Orders:    losartan (COZAAR) 25 mg tablet; Take 1 tablet (25 mg total) by mouth daily

## 2025-01-28 VITALS — SYSTOLIC BLOOD PRESSURE: 164 MMHG | DIASTOLIC BLOOD PRESSURE: 102 MMHG

## 2025-02-05 ENCOUNTER — OFFICE VISIT (OUTPATIENT)
Dept: URGENT CARE | Facility: CLINIC | Age: 45
End: 2025-02-05
Payer: COMMERCIAL

## 2025-02-05 VITALS
HEART RATE: 119 BPM | WEIGHT: 279 LBS | TEMPERATURE: 97.3 F | BODY MASS INDEX: 33.08 KG/M2 | DIASTOLIC BLOOD PRESSURE: 98 MMHG | SYSTOLIC BLOOD PRESSURE: 180 MMHG | RESPIRATION RATE: 18 BRPM | OXYGEN SATURATION: 96 %

## 2025-02-05 DIAGNOSIS — R68.89 FLU-LIKE SYMPTOMS: ICD-10-CM

## 2025-02-05 DIAGNOSIS — J06.9 ACUTE UPPER RESPIRATORY INFECTION: Primary | ICD-10-CM

## 2025-02-05 PROCEDURE — 87636 SARSCOV2 & INF A&B AMP PRB: CPT

## 2025-02-05 PROCEDURE — 99214 OFFICE O/P EST MOD 30 MIN: CPT

## 2025-02-05 RX ORDER — OSELTAMIVIR PHOSPHATE 75 MG/1
75 CAPSULE ORAL EVERY 12 HOURS SCHEDULED
Qty: 10 CAPSULE | Refills: 0 | Status: SHIPPED | OUTPATIENT
Start: 2025-02-05 | End: 2025-02-10

## 2025-02-05 NOTE — PROGRESS NOTES
[facility]        NAME: Jared Mcghee is a 44 y.o. male  : 1980    MRN: [id number]  DATE: 2025  TIME: 10:42 AM    Assessment and Plan   Acute upper respiratory infection [J06.9]  1. Acute upper respiratory infection  oseltamivir (TAMIFLU) 75 mg capsule      2. Flu-like symptoms  oseltamivir (TAMIFLU) 75 mg capsule    Covid/Flu- Office Collect Normal        COVID/flu PCR sent to lab, will f/u if positive. Suspect viral illness given clinical presentation.Patient electing to start Tamiflu en john of results.  BP elevated in clinic - patient denies associated headache, CP, SOB, or vision changes. He did not take BP medications this morning. Patient otherwise appears in no acute distress. Educated on use of OTC products for symptoms. Advised close follow-up with PCP or to report to the ER if symptoms worsen. Patient verbalizes understanding and agreeable to plan.  Work note provided.    Patient Instructions     Start Tamiflu today. Check MyChart in 1-2 days, if negative for flu, stop taking medication.   Take BP medications as soon as you get home. If develop headaches, dizziness, vision changes, arm pain, chest pain, or shortness of breath, go to the ER immediately.   Monitor blood sugar levels closely, go to the ER for any blood sugar level >300.   Symptoms of a viral infection may linger for up to 10 days. Your contagious period is the first 5-7 days of symptom onset.  Continue over-the-counter products for symptoms: tylenol for fevers, ibuprofen for body aches, flonase (fluticasone) with nasal saline for congestion, robitussin/coricidin for cough, and airborne/emergen-c for vitamin supplementation.   Follow-up with PCP in 3-5 days. Report to ER if symptoms worsen.       Chief Complaint     Chief Complaint   Patient presents with    Influenza     Flu like symptoms started a few days ago.         History of Present Illness       44 year old male presents for evaluation of nausea, cough,  congestion, and fatigue x 2 days. He relates his children at home have been sick during this time. He denies any other known sick contacts or triggers. PMH significant for HTN and diabetes. He reports his blood glucose levels have been elevated (200 range) during this time. He is not vaccinated against COVID or flu. He reports h/o asthma but has not needed his inhalers during this time. He denies SOB or productive sputum. He reports mild nausea but denies abdominal pain or vomiting. He has not tried any interventions for symptoms.     URI   This is a new problem. The current episode started in the past 7 days. The problem has been unchanged. There has been no fever. Associated symptoms include congestion, coughing, joint pain and rhinorrhea. Pertinent negatives include no abdominal pain, chest pain, diarrhea, dysuria, ear pain, headaches, joint swelling, nausea, neck pain, plugged ear sensation, rash, sinus pain, sneezing, sore throat, swollen glands, vomiting or wheezing. He has tried nothing for the symptoms. The treatment provided no relief.       Review of Systems   Review of Systems   Constitutional:  Positive for activity change, chills and fatigue. Negative for appetite change and fever.   HENT:  Positive for congestion, postnasal drip and rhinorrhea. Negative for ear discharge, ear pain, sinus pressure, sinus pain, sneezing, sore throat and trouble swallowing.    Respiratory:  Positive for cough. Negative for chest tightness, shortness of breath and wheezing.    Cardiovascular:  Negative for chest pain and palpitations.   Gastrointestinal:  Negative for abdominal pain, constipation, diarrhea, nausea and vomiting.   Genitourinary:  Negative for dysuria.   Musculoskeletal:  Positive for joint pain. Negative for arthralgias, back pain, myalgias and neck pain.   Skin:  Negative for color change, pallor and rash.   Allergic/Immunologic: Negative for environmental allergies and food allergies.   Neurological:   Negative for dizziness, light-headedness and headaches.         Current Medications       Current Outpatient Medications:     oseltamivir (TAMIFLU) 75 mg capsule, Take 1 capsule (75 mg total) by mouth every 12 (twelve) hours for 5 days, Disp: 10 capsule, Rfl: 0    albuterol (2.5 mg/3 mL) 0.083 % nebulizer solution, Take 3 mL (2.5 mg total) by nebulization every 6 (six) hours as needed for wheezing or shortness of breath, Disp: 180 mL, Rfl: 0    albuterol (Proventil HFA) 90 mcg/act inhaler, Inhale 2 puffs every 6 (six) hours as needed for wheezing or shortness of breath, Disp: 6.7 g, Rfl: 0    Blood Glucose Monitoring Suppl (Contour Next EZ) w/Device KIT, 2 (two) times a day Use as directed, Disp: , Rfl:     Contour Next Test test strip, Use as instructed, Disp: 100 each, Rfl: 2    Droplet Pen Needles 32G X 4 MM MISC, Use one (1) pen needle to inject medication subcutaneously once daily, Disp: 30 each, Rfl: 2    Empagliflozin (Jardiance) 10 MG TABS tablet, Take 1 tablet (10 mg total) by mouth every morning (Patient not taking: Reported on 1/27/2025), Disp: 30 tablet, Rfl: 5    fluticasone (FLONASE) 50 mcg/act nasal spray, 1 spray into each nostril daily (Patient not taking: Reported on 1/27/2025), Disp: 15.8 mL, Rfl: 1    Insulin Glargine Solostar (Lantus SoloStar) 100 UNIT/ML SOPN, Inject 0.1 mL (10 Units total) under the skin daily (Patient not taking: Reported on 1/27/2025), Disp: 3 mL, Rfl: 2    Insulin Glargine-yfgn 100 UNIT/ML SOPN, INJECT 10 UNITS UNDER THE SKIN AT BEDTIME (Patient not taking: Reported on 1/27/2025), Disp: , Rfl:     losartan (COZAAR) 25 mg tablet, Take 1 tablet (25 mg total) by mouth daily, Disp: 30 tablet, Rfl: 1    Microlet Lancets MISC, use 1 LANCET to TEST BLOOD SUGAR twice a day, Disp: , Rfl:     pantoprazole (PROTONIX) 20 mg tablet, Take 1 tablet (20 mg total) by mouth daily, Disp: 30 tablet, Rfl: 1    RA Alcohol Swabs 70 % PADS, 2 (two) times a day Use as directed, Disp: , Rfl:      triamcinolone (KENALOG) 0.1 % ointment, Apply topically 3 (three) times a day To affected area, Disp: 15 g, Rfl: 1    Current Allergies     Allergies as of 02/05/2025 - Reviewed 02/05/2025   Allergen Reaction Noted    Other Anaphylaxis 10/05/2016    Metformin Headache 10/09/2024    Morphine and codeine Rash 10/05/2016            The following portions of the patient's history were reviewed and updated as appropriate: allergies, current medications, past family history, past medical history, past social history, past surgical history and problem list.     Past Medical History:   Diagnosis Date    Asthma     Diabetes mellitus (HCC)     Eczema     GI problem        Past Surgical History:   Procedure Laterality Date    CHOLECYSTECTOMY      CHOLECYSTECTOMY LAPAROSCOPIC N/A 10/7/2016    Procedure: CHOLECYSTECTOMY LAPAROSCOPIC, POSS. OPEN;  Surgeon: Nick Rueda MD;  Location: [facility];  Service:     NO PAST SURGERIES         History reviewed. No pertinent family history.      Medications have been verified.        Objective   BP (!) 180/98   Pulse (!) 119   Temp (!) 97.3 °F (36.3 °C)   Resp 18   Wt 127 kg (279 lb)   SpO2 96%   BMI 33.08 kg/m²        Physical Exam     Physical Exam  Vitals and nursing note reviewed.   Constitutional:       General: He is awake. He is not in acute distress.     Appearance: Normal appearance. He is overweight.   HENT:      Head: Normocephalic and atraumatic.      Right Ear: Hearing, tympanic membrane, ear canal and external ear normal.      Left Ear: Hearing, tympanic membrane, ear canal and external ear normal.      Nose: Congestion and rhinorrhea present. Rhinorrhea is clear.      Right Turbinates: Not enlarged, swollen or pale.      Left Turbinates: Not enlarged, swollen or pale.      Right Sinus: No maxillary sinus tenderness or frontal sinus tenderness.      Left Sinus: No maxillary sinus tenderness or frontal sinus tenderness.      Mouth/Throat:      Lips: Pink.      Mouth:  Mucous membranes are moist.      Pharynx: Oropharynx is clear. Uvula midline. Postnasal drip present. No oropharyngeal exudate or posterior oropharyngeal erythema.      Tonsils: No tonsillar exudate or tonsillar abscesses. 2+ on the right. 2+ on the left.   Eyes:      Conjunctiva/sclera: Conjunctivae normal.   Cardiovascular:      Rate and Rhythm: Regular rhythm. Tachycardia present.      Pulses: Normal pulses.      Heart sounds: Normal heart sounds.   Pulmonary:      Effort: Pulmonary effort is normal.      Breath sounds: Normal breath sounds.   Musculoskeletal:      Cervical back: Full passive range of motion without pain, normal range of motion and neck supple.   Lymphadenopathy:      Cervical: No cervical adenopathy.   Skin:     General: Skin is warm and dry.   Neurological:      General: No focal deficit present.      Mental Status: He is alert and oriented to person, place, and time.   Psychiatric:         Mood and Affect: Mood normal.         Behavior: Behavior normal. Behavior is cooperative.         Thought Content: Thought content normal.         Judgment: Judgment normal.

## 2025-02-05 NOTE — LETTER
February 5, 2025     Patient: Jared Mcghee   YOB: 1980   Date of Visit: 2/5/2025       To Whom it May Concern:    Jared Mcghee was seen in my clinic on 2/5/2025. He may return to work on 2/7/2025 .    If you have any questions or concerns, please don't hesitate to call.         Sincerely,          MANA Baird        CC: No Recipients

## 2025-02-05 NOTE — PATIENT INSTRUCTIONS
Start Tamiflu today. Check MyChart in 1-2 days, if negative for flu, stop taking medication.   Take BP medications as soon as you get home. If develop headaches, dizziness, vision changes, arm pain, chest pain, or shortness of breath, go to the ER immediately.   Monitor blood sugar levels closely, go to the ER for any blood sugar level >300.   Symptoms of a viral infection may linger for up to 10 days. Your contagious period is the first 5-7 days of symptom onset.  Continue over-the-counter products for symptoms: tylenol for fevers, ibuprofen for body aches, flonase (fluticasone) with nasal saline for congestion, robitussin/coricidin for cough, and airborne/emergen-c for vitamin supplementation.   Follow-up with PCP in 3-5 days. Report to ER if symptoms worsen.

## 2025-02-06 ENCOUNTER — DOCUMENTATION (OUTPATIENT)
Dept: ADMINISTRATIVE | Facility: OTHER | Age: 45
End: 2025-02-06

## 2025-02-06 LAB
FLUAV RNA RESP QL NAA+PROBE: NEGATIVE
FLUBV RNA RESP QL NAA+PROBE: NEGATIVE
SARS-COV-2 RNA RESP QL NAA+PROBE: NEGATIVE

## 2025-02-06 NOTE — PROGRESS NOTES
Blood pressure elevated  Appointment department: [facility]  Appointment provider: MANA Baird  Blood pressure   02/05/25 1003 (!) 180/98   02/05/25 0940 (!) 192/119     02/06/25 11:03 AM    Patient was called after the Urgent Care visit Patient has an upcoming appointment with their primary care provider on 2/7/2025 to follow on an elevated Blood pressure.      Thank you.  Bear Garay MA  [facility]              PT GT TRAINED X 100' WITH O2 IN TOW AND SBA.

## 2025-02-07 ENCOUNTER — OFFICE VISIT (OUTPATIENT)
Age: 45
End: 2025-02-07

## 2025-02-07 VITALS
SYSTOLIC BLOOD PRESSURE: 164 MMHG | DIASTOLIC BLOOD PRESSURE: 110 MMHG | TEMPERATURE: 99.3 F | RESPIRATION RATE: 21 BRPM | HEIGHT: 77 IN | OXYGEN SATURATION: 94 % | WEIGHT: 278.7 LBS | BODY MASS INDEX: 32.91 KG/M2 | HEART RATE: 121 BPM

## 2025-02-07 DIAGNOSIS — Z79.4 TYPE 2 DIABETES MELLITUS WITHOUT COMPLICATION, WITH LONG-TERM CURRENT USE OF INSULIN (HCC): ICD-10-CM

## 2025-02-07 DIAGNOSIS — J06.9 UPPER RESPIRATORY TRACT INFECTION, UNSPECIFIED TYPE: ICD-10-CM

## 2025-02-07 DIAGNOSIS — I10 PRIMARY HYPERTENSION: Primary | ICD-10-CM

## 2025-02-07 DIAGNOSIS — E11.9 TYPE 2 DIABETES MELLITUS WITHOUT COMPLICATION, WITH LONG-TERM CURRENT USE OF INSULIN (HCC): ICD-10-CM

## 2025-02-07 LAB — SL AMB POCT HEMOGLOBIN AIC: 7.4 (ref ?–6.5)

## 2025-02-07 PROCEDURE — 99214 OFFICE O/P EST MOD 30 MIN: CPT | Performed by: FAMILY MEDICINE

## 2025-02-07 PROCEDURE — 83036 HEMOGLOBIN GLYCOSYLATED A1C: CPT | Performed by: FAMILY MEDICINE

## 2025-02-07 RX ORDER — LOSARTAN POTASSIUM 25 MG/1
50 TABLET ORAL DAILY
Qty: 60 TABLET | Refills: 1 | Status: SHIPPED | OUTPATIENT
Start: 2025-02-07

## 2025-02-07 NOTE — LETTER
February 7, 2025     Patient: Jared Mcghee  YOB: 1980  Date of Visit: 2/7/2025      To Whom it May Concern:    Jared Mcghee is under my professional care. Jared was seen in my office on 2/7/2025. Jared may return to work on Monday 02/10/2025 .    If you have any questions or concerns, please don't hesitate to call.         Sincerely,              Elaine Coppola, DO

## 2025-02-07 NOTE — ASSESSMENT & PLAN NOTE
Lab Results   Component Value Date    HGBA1C 7.4 (A) 02/07/2025    HGBA1C 5.9 07/31/2024    HGBA1C 10.8 (A) 03/18/2024     Patient had been on 10 units daily of Lantus, but decreased down to 5 units as A1c had decreased to 5.9.   A1c at today's visit is 7.4  Will increase Lantus from 5 units daily to 8 units daily   Recheck A1c in 3 months   Orders:    POCT hemoglobin A1c    Basic metabolic panel; Future

## 2025-02-07 NOTE — PROGRESS NOTES
Name: Jared Mcghee      : 1980      MRN: [id number]  Encounter Provider: Elaine Coppola DO  Encounter Date: 2025   Encounter department: [facility]  :  Assessment & Plan  Primary hypertension  Hx of hypertension, previously on lisinopril but stopped taking it because he thought it was giving him side effects of lightheadedness  Started on Losartan 25 mg during last office visit 2 weeks ago. Has been checking BP at home but forgot to log.   /114 at this visit, 164/110 on manual recheck     Will increase Losartan to 50 mg daily   Provided log for patient to record BP values over next 2 weeks   Encouraged compliance with medication regimen   Headache likely related to elevated BP   ED precautions for symptomatic HTN provided   Orders:    losartan (COZAAR) 25 mg tablet; Take 2 tablets (50 mg total) by mouth daily    Type 2 diabetes mellitus without complication, with long-term current use of insulin ([facility])  Lab Results   Component Value Date    HGBA1C 7.4 (A) 2025    HGBA1C 5.9 2024    HGBA1C 10.8 (A) 2024     Patient had been on 10 units daily of Lantus, but decreased down to 5 units as A1c had decreased to 5.9.   A1c at today's visit is 7.4  Will increase Lantus from 5 units daily to 8 units daily   Recheck A1c in 3 months   Orders:    POCT hemoglobin A1c    Basic metabolic panel; Future    Upper respiratory tract infection, unspecified type  Acute cough for past 3 days associated with rhinorrhea, headache, wheezing. Has been using albuterol inhaler PRN.   Went to urgent care on , POCT Covid and Flu negative at that time   Wife, brother, nephew in same household, also all sick with similar symptoms   Possible Viral URI     Symptomatic management/supportive measures   OTC antipyretics, analgesics, or antitussives for fever, headache, myalgias, and cough  Maintain adequate hydration. Drink fluids regularly to avoid dehydration.   Home remedies    Netti pot or saline spray for nasal congestion   Menthol/Vicks inhalers or steam for congestion   Warm tea with honey and salt water gargle for sore throat   Get plenty of rest   Continue daily activities as tolerated   Symptom resolution and complete recovery time vary per patient   Cough may last 6-8 weeks after viral URI resolution   ED precautions for cough/dyspnea given  RTO if symptoms do not improve or worsen in next few days   Can use albuterol scheduled Q8h for next 3-4 days, however advised caution as patient's BP is elevated during this visit. Recommended checking pulse at home and avoiding albuterol if able if HR >110.   Headache likely related to elevated BP                 History of Present Illness   Patient presents to follow up on HTN and diabetes. States he has been taking his losartan 25 mg daily, but BP is still elevated at today's visit.   Also here to follow up on diabetes. A1c previously had been 5.9, had decreased Lantus from 10 units daily to 5 units daily. A1c at today's visit increased to 7.4    Patient reports that he started feeling sick on Tuesday (3 days ago) with runny nose, dry cough, headache, and wheezing. He has been using his albuterol, which helped with the wheezing a little. This morning he had a little bit of vomiting. He went to urgent care on 2/5, POCT Covid and Flu were both negative.   Patient reports his wife, brother, and nephew are all also sick with similar symptoms, they all live in the same household.        Review of Systems   Constitutional:  Positive for fatigue.   HENT:  Positive for rhinorrhea.    Respiratory:  Positive for choking and wheezing.    Gastrointestinal:  Negative for abdominal pain, nausea and vomiting.   Genitourinary:  Negative for dysuria and hematuria.   Musculoskeletal:  Negative for back pain and myalgias.   Skin:  Negative for color change, pallor and rash.   Neurological:  Positive for headaches.   All other systems reviewed and are  "negative.      Objective   BP (!) 164/110 (BP Location: Right arm, Patient Position: Sitting)   Pulse (!) 121   Temp 99.3 °F (37.4 °C) (Oral)   Resp 21   Ht 6' 5\" (1.956 m)   Wt 126 kg (278 lb 11.2 oz)   SpO2 94%   BMI 33.05 kg/m²      Physical Exam  Constitutional:       General: He is not in acute distress.     Appearance: He is obese.   HENT:      Head: Normocephalic and atraumatic.      Mouth/Throat:      Mouth: Mucous membranes are moist.      Pharynx: Oropharynx is clear.   Eyes:      General: No scleral icterus.     Extraocular Movements: Extraocular movements intact.      Conjunctiva/sclera: Conjunctivae normal.   Cardiovascular:      Rate and Rhythm: Regular rhythm. Tachycardia present.      Pulses: Normal pulses.      Heart sounds: Normal heart sounds. No murmur heard.  Pulmonary:      Effort: Pulmonary effort is normal.      Breath sounds: Wheezing (mild expiratory wheezes diffusely) present.   Abdominal:      General: Bowel sounds are normal.      Palpations: Abdomen is soft.      Tenderness: There is no abdominal tenderness.   Musculoskeletal:         General: Normal range of motion.      Cervical back: Normal range of motion.      Right lower leg: No edema.      Left lower leg: No edema.   Skin:     General: Skin is warm.   Neurological:      General: No focal deficit present.      Mental Status: He is alert and oriented to person, place, and time.   Psychiatric:         Mood and Affect: Mood normal.         Behavior: Behavior normal.         "

## 2025-02-09 NOTE — ASSESSMENT & PLAN NOTE
Hx of hypertension, previously on lisinopril but stopped taking it because he thought it was giving him side effects of lightheadedness  Started on Losartan 25 mg during last office visit 2 weeks ago. Has been checking BP at home but forgot to log.   /114 at this visit, 164/110 on manual recheck     Will increase Losartan to 50 mg daily   Provided log for patient to record BP values over next 2 weeks   Encouraged compliance with medication regimen   Headache likely related to elevated BP   ED precautions for symptomatic HTN provided   Orders:    losartan (COZAAR) 25 mg tablet; Take 2 tablets (50 mg total) by mouth daily

## 2025-03-11 ENCOUNTER — OFFICE VISIT (OUTPATIENT)
Age: 45
End: 2025-03-11

## 2025-03-11 VITALS
SYSTOLIC BLOOD PRESSURE: 158 MMHG | TEMPERATURE: 97.6 F | WEIGHT: 274 LBS | RESPIRATION RATE: 17 BRPM | DIASTOLIC BLOOD PRESSURE: 110 MMHG | BODY MASS INDEX: 32.35 KG/M2 | HEART RATE: 103 BPM | HEIGHT: 77 IN | OXYGEN SATURATION: 96 %

## 2025-03-11 DIAGNOSIS — Z79.4 TYPE 2 DIABETES MELLITUS WITHOUT COMPLICATION, WITH LONG-TERM CURRENT USE OF INSULIN (HCC): ICD-10-CM

## 2025-03-11 DIAGNOSIS — I10 PRIMARY HYPERTENSION: Primary | ICD-10-CM

## 2025-03-11 DIAGNOSIS — E11.9 TYPE 2 DIABETES MELLITUS WITHOUT COMPLICATION, WITH LONG-TERM CURRENT USE OF INSULIN (HCC): ICD-10-CM

## 2025-03-11 DIAGNOSIS — Z12.11 SCREEN FOR COLON CANCER: ICD-10-CM

## 2025-03-11 PROCEDURE — 99213 OFFICE O/P EST LOW 20 MIN: CPT | Performed by: FAMILY MEDICINE

## 2025-03-11 RX ORDER — PEN NEEDLE, DIABETIC 32GX 5/32"
NEEDLE, DISPOSABLE MISCELLANEOUS
Qty: 100 EACH | Refills: 3 | Status: SHIPPED | OUTPATIENT
Start: 2025-03-11

## 2025-03-11 RX ORDER — LOSARTAN POTASSIUM AND HYDROCHLOROTHIAZIDE 12.5; 1 MG/1; MG/1
1 TABLET ORAL DAILY
Qty: 30 TABLET | Refills: 2 | Status: SHIPPED | OUTPATIENT
Start: 2025-03-11

## 2025-03-11 RX ORDER — INSULIN GLARGINE 100 [IU]/ML
10 INJECTION, SOLUTION SUBCUTANEOUS DAILY
Qty: 3 ML | Refills: 2 | Status: SHIPPED | OUTPATIENT
Start: 2025-03-11

## 2025-03-11 NOTE — ASSESSMENT & PLAN NOTE
HTN follow up visit   /108 on arrival. /110 on manual repeat   States he has been taking his losartan 50 mg daily   Denies sx of headaches, dizziness, blurry vision, chest pain, palpitations     Increase losartan to 100 mg daily.   Add Hctz 12.5 mg daily   Discussed diet/lifestyle modifications. Weight loss of 10% can decrease risk of cardiac issues including HTN   Follow up in 4 weeks   Orders:    losartan-hydrochlorothiazide (HYZAAR) 100-12.5 MG per tablet; Take 1 tablet by mouth daily

## 2025-03-11 NOTE — ASSESSMENT & PLAN NOTE
Lab Results   Component Value Date    HGBA1C 7.4 (A) 02/07/2025    HGBA1C 5.9 07/31/2024    HGBA1C 10.8 (A) 03/18/2024     Patient had decrease Lantus to 5 units nightly due to drop in A1c to 5.9.   Most recent A1c was 7.4. Advised patient to increase Lantus to 8 units nightly     Continue current regimen of Lantus 8 units nightly     Orders:    Albumin / creatinine urine ratio; Future    Insulin Pen Needle (BD Pen Needle Destiny U/F) 32G X 4 MM MISC; Use daily as directed with insulin pen

## 2025-03-11 NOTE — TELEPHONE ENCOUNTER
Patient is stating that he doesn't have anymore of this, and needs to take it. I told him I would send the message along.  Thanks.

## 2025-03-11 NOTE — TELEPHONE ENCOUNTER
Pharmacy is on the phone. PT is out completely and they can take a verbal to fill this or a script for this. PT is at pharmacy . Thank you

## 2025-03-11 NOTE — PROGRESS NOTES
Name: Jared Mcghee      : 1980      MRN: [id number]  Encounter Provider: Elaine Coppola DO  Encounter Date: 3/11/2025   Encounter department: [facility]  :  Assessment & Plan  Primary hypertension  HTN follow up visit   /108 on arrival. /110 on manual repeat   States he has been taking his losartan 50 mg daily   Denies sx of headaches, dizziness, blurry vision, chest pain, palpitations     Increase losartan to 100 mg daily.   Add Hctz 12.5 mg daily   Discussed diet/lifestyle modifications. Weight loss of 10% can decrease risk of cardiac issues including HTN   Follow up in 4 weeks   Orders:    losartan-hydrochlorothiazide (HYZAAR) 100-12.5 MG per tablet; Take 1 tablet by mouth daily    Type 2 diabetes mellitus without complication, with long-term current use of insulin ([facility])    Lab Results   Component Value Date    HGBA1C 7.4 (A) 2025    HGBA1C 5.9 2024    HGBA1C 10.8 (A) 2024     Patient had decrease Lantus to 5 units nightly due to drop in A1c to 5.9.   Most recent A1c was 7.4. Advised patient to increase Lantus to 8 units nightly     Continue current regimen of Lantus 8 units nightly     Orders:    Albumin / creatinine urine ratio; Future    Insulin Pen Needle (BD Pen Needle Destiny U/F) 32G X 4 MM MISC; Use daily as directed with insulin pen    Screen for colon cancer    Orders:    Ambulatory Referral to Gastroenterology; Future           History of Present Illness   Jared Mcghee is a 44 y.o. male who presents to follow up on his HTN.   /108 on arrival.         Review of Systems   Constitutional:  Negative for chills and fever.   HENT:  Negative for ear pain and sore throat.    Eyes:  Negative for photophobia, pain and visual disturbance.   Respiratory:  Negative for cough and shortness of breath.    Cardiovascular:  Negative for chest pain and palpitations.   Gastrointestinal:  Negative for abdominal pain and vomiting.  "  Genitourinary:  Negative for dysuria and hematuria.   Musculoskeletal:  Negative for arthralgias and back pain.   Skin:  Negative for color change and rash.   Neurological:  Negative for dizziness, light-headedness and headaches.   All other systems reviewed and are negative.      Objective   BP (!) 158/110 (BP Location: Left arm)   Pulse 103   Temp 97.6 °F (36.4 °C)   Resp 17   Ht 6' 5\" (1.956 m)   Wt 124 kg (274 lb)   SpO2 96%   BMI 32.49 kg/m²      Physical Exam  Constitutional:       General: He is not in acute distress.     Appearance: He is obese. He is not ill-appearing.   HENT:      Head: Normocephalic.      Mouth/Throat:      Mouth: Mucous membranes are moist.   Eyes:      General: No scleral icterus.     Extraocular Movements: Extraocular movements intact.      Conjunctiva/sclera: Conjunctivae normal.   Cardiovascular:      Rate and Rhythm: Normal rate and regular rhythm.      Heart sounds: Normal heart sounds. No murmur heard.  Pulmonary:      Effort: Pulmonary effort is normal. No respiratory distress.      Breath sounds: Normal breath sounds.   Abdominal:      General: Bowel sounds are normal.      Palpations: Abdomen is soft. There is no mass.   Musculoskeletal:         General: Normal range of motion.      Cervical back: Normal range of motion.      Right lower leg: No edema.      Left lower leg: No edema.   Skin:     General: Skin is warm.   Neurological:      General: No focal deficit present.      Mental Status: He is alert and oriented to person, place, and time.   Psychiatric:         Mood and Affect: Mood normal.         Behavior: Behavior normal.         "

## 2025-04-09 ENCOUNTER — OFFICE VISIT (OUTPATIENT)
Dept: URGENT CARE | Facility: CLINIC | Age: 45
End: 2025-04-09
Payer: COMMERCIAL

## 2025-04-09 VITALS
BODY MASS INDEX: 32.18 KG/M2 | HEART RATE: 108 BPM | DIASTOLIC BLOOD PRESSURE: 104 MMHG | OXYGEN SATURATION: 95 % | SYSTOLIC BLOOD PRESSURE: 158 MMHG | RESPIRATION RATE: 20 BRPM | WEIGHT: 271.4 LBS | TEMPERATURE: 98.7 F

## 2025-04-09 DIAGNOSIS — B34.9 VIRAL SYNDROME: Primary | ICD-10-CM

## 2025-04-09 DIAGNOSIS — R42 DIZZINESS: ICD-10-CM

## 2025-04-09 DIAGNOSIS — R03.0 ELEVATED BLOOD PRESSURE READING: ICD-10-CM

## 2025-04-09 LAB — GLUCOSE SERPL-MCNC: 182 MG/DL (ref 65–140)

## 2025-04-09 PROCEDURE — 87636 SARSCOV2 & INF A&B AMP PRB: CPT

## 2025-04-09 PROCEDURE — 99214 OFFICE O/P EST MOD 30 MIN: CPT | Performed by: PHYSICIAN ASSISTANT

## 2025-04-09 PROCEDURE — 93000 ELECTROCARDIOGRAM COMPLETE: CPT | Performed by: PHYSICIAN ASSISTANT

## 2025-04-09 NOTE — LETTER
April 9, 2025     Patient: Jared Mcghee   YOB: 1980   Date of Visit: 4/9/2025       To Whom it May Concern:    Jared Mcghee was seen in my clinic on 4/9/2025. He may return to school/work when starting to feel better and when they have been fever-free for at least 24 hours without the use of fever reducing medications.    If you have any questions or concerns, please don't hesitate to call.         Sincerely,          Muna Hinton PA-C        CC: No Recipients

## 2025-04-09 NOTE — PROGRESS NOTES
"  [facility]        NAME: Jared Mcghee is a 44 y.o. male  : 1980    MRN: [id number]  DATE: 2025  TIME: 10:05 AM    Assessment and Plan   Viral syndrome [B34.9]  1. Viral syndrome  Covid/Flu- Office Collect Normal      2. Dizziness  Fingerstick Glucose (POCT)    POCT ECG      3. Elevated blood pressure reading          Discussed importance of staying hydrated. Discussed supportive care and otc meds for symptomatic relief. May use tea with honey and a cool mist humidifier for symptoms. Encouraged salt water gargles if sore throat. .   Glucose 182 and EKG mostly normal (sinus tach and some nonspecific abnormalities which are unchanged when compared to EKG from 25). Suspect dizziness, weakness related to viral syndrome.  Elevated BP today- has been this high at multiple other visits. Encouraged pcp fu.    Discussed strict return to care precautions as well as red flag symptoms which should prompt immediate ED referral. Pt verbalized understanding and is in agreement with plan.      Patient Instructions       Follow up with PCP in 3-5 days.  Proceed to  ER if symptoms worsen.    If tests are performed, our office will contact you with results only if changes need to made to the care plan discussed with you at the visit. [facility]    Chief Complaint     Chief Complaint   Patient presents with    Cough     Reports cough, headache, nasal congestion, dizziness, stuffiness, decreased appetite, diarrhea and vomiting \"bile\" that started yesterday.  Sweating started today and no intake today. Used DayQuil.          History of Present Illness       Jared Mcghee is a(n) 44 y.o. male presenting with URI symptoms x 1 days  Past medical history: DM2 on insulin, asthma  Congestion: yes  Sore throat: no  Cough: yes  Sputum production: no  Fever: subjective  Body aches: yes  GI symptoms: no appetite, nausea, vomiting, diarrhea  Known sick contacts: yes, " at work  OTC meds tried: dayquil  +weakness, dizziness. Has not eaten today but has been drinking water.        Review of Systems   Review of Systems   Constitutional:  Positive for diaphoresis. Negative for chills and fatigue.   HENT:  Positive for congestion. Negative for ear pain, postnasal drip, rhinorrhea, sinus pain, sneezing and trouble swallowing.    Eyes:  Negative for pain and redness.   Respiratory:  Positive for cough and shortness of breath. Negative for chest tightness and wheezing.    Cardiovascular:  Negative for chest pain and leg swelling.   Gastrointestinal:  Positive for diarrhea, nausea and vomiting.   Musculoskeletal:  Positive for myalgias.   Neurological:  Positive for dizziness and weakness.         Current Medications       Current Outpatient Medications:     albuterol (2.5 mg/3 mL) 0.083 % nebulizer solution, Take 3 mL (2.5 mg total) by nebulization every 6 (six) hours as needed for wheezing or shortness of breath, Disp: 180 mL, Rfl: 0    albuterol (Proventil HFA) 90 mcg/act inhaler, Inhale 2 puffs every 6 (six) hours as needed for wheezing or shortness of breath, Disp: 6.7 g, Rfl: 0    Blood Glucose Monitoring Suppl (Contour Next EZ) w/Device KIT, 2 (two) times a day Use as directed, Disp: , Rfl:     Contour Next Test test strip, Use as instructed, Disp: 100 each, Rfl: 2    Insulin Glargine Solostar (Lantus SoloStar) 100 UNIT/ML SOPN, Inject 0.1 mL (10 Units total) under the skin daily (Patient taking differently: Inject 0.1 mL (10 Units total) under the skin daily), Disp: 3 mL, Rfl: 2    Insulin Pen Needle (BD Pen Needle Destiny U/F) 32G X 4 MM MISC, Use daily as directed with insulin pen, Disp: 100 each, Rfl: 3    Microlet Lancets MISC, use 1 LANCET to TEST BLOOD SUGAR twice a day, Disp: , Rfl:     pantoprazole (PROTONIX) 20 mg tablet, Take 1 tablet (20 mg total) by mouth daily, Disp: 30 tablet, Rfl: 1    RA Alcohol Swabs 70 % PADS, 2 (two) times a day Use as directed, Disp: , Rfl:      triamcinolone (KENALOG) 0.1 % ointment, Apply topically 3 (three) times a day To affected area, Disp: 15 g, Rfl: 1    losartan-hydrochlorothiazide (HYZAAR) 100-12.5 MG per tablet, Take 1 tablet by mouth daily (Patient not taking: Reported on 4/9/2025), Disp: 30 tablet, Rfl: 2    Current Allergies     Allergies as of 04/09/2025 - Reviewed 04/09/2025   Allergen Reaction Noted    Other Anaphylaxis 10/05/2016    Metformin Headache 10/09/2024    Morphine and codeine Rash 10/05/2016            The following portions of the patient's history were reviewed and updated as appropriate: allergies, current medications, past family history, past medical history, past social history, past surgical history and problem list.     Past Medical History:   Diagnosis Date    Asthma     Diabetes mellitus (HCC)     Eczema     GI problem        Past Surgical History:   Procedure Laterality Date    CHOLECYSTECTOMY      CHOLECYSTECTOMY LAPAROSCOPIC N/A 10/7/2016    Procedure: CHOLECYSTECTOMY LAPAROSCOPIC, POSS. OPEN;  Surgeon: Nick Rueda MD;  Location: [facility];  Service:     NO PAST SURGERIES         History reviewed. No pertinent family history.      Medications have been verified.        Objective   BP (!) 158/104 Comment: manual  Pulse (!) 118   Temp 98.7 °F (37.1 °C) (Oral)   Resp 20   Wt 123 kg (271 lb 6.4 oz)   SpO2 95%   BMI 32.18 kg/m²        Physical Exam     Physical Exam  Vitals and nursing note reviewed.   Constitutional:       General: He is not in acute distress.     Appearance: Normal appearance. He is not toxic-appearing.   HENT:      Head: Normocephalic and atraumatic.      Right Ear: Tympanic membrane, ear canal and external ear normal.      Left Ear: Tympanic membrane, ear canal and external ear normal.      Nose: No congestion.      Mouth/Throat:      Mouth: Mucous membranes are moist.      Pharynx: Oropharynx is clear. No oropharyngeal exudate or posterior oropharyngeal erythema.   Eyes:       Conjunctiva/sclera: Conjunctivae normal.      Pupils: Pupils are equal, round, and reactive to light.   Cardiovascular:      Rate and Rhythm: Regular rhythm. Tachycardia present.      Heart sounds: Normal heart sounds.   Pulmonary:      Effort: Pulmonary effort is normal. No respiratory distress.      Breath sounds: Normal breath sounds. No wheezing, rhonchi or rales.   Musculoskeletal:      Cervical back: Normal range of motion and neck supple.   Lymphadenopathy:      Cervical: No cervical adenopathy.   Skin:     General: Skin is warm and dry.      Capillary Refill: Capillary refill takes less than 2 seconds.   Neurological:      Mental Status: He is alert and oriented to person, place, and time.   Psychiatric:         Behavior: Behavior normal.

## 2025-04-11 ENCOUNTER — DOCUMENTATION (OUTPATIENT)
Dept: ADMINISTRATIVE | Facility: OTHER | Age: 45
End: 2025-04-11

## 2025-04-11 NOTE — PROGRESS NOTES
Blood pressure elevated  Appointment department: [facility]  Appointment provider: * No providers found *  Blood pressure   04/09/25 0948 (!) 158/104   04/09/25 0938 (!) 170/108     04/11/25 10:15 AM    Patient was called after the Urgent Care visit Patient declined to schedule appointment., Patient has an upcoming appointment with their primary care provider on 4/21/25 to follow on an elevated Blood pressure.      Thank you.  Rebecca Ayala  [facility]

## 2025-07-03 DIAGNOSIS — K21.9 GASTROESOPHAGEAL REFLUX DISEASE WITHOUT ESOPHAGITIS: ICD-10-CM

## 2025-07-03 RX ORDER — PANTOPRAZOLE SODIUM 20 MG/1
20 TABLET, DELAYED RELEASE ORAL DAILY
Qty: 30 TABLET | Refills: 1 | Status: SHIPPED | OUTPATIENT
Start: 2025-07-03

## 2025-07-03 NOTE — TELEPHONE ENCOUNTER
Fax received from Pharmacy requesting:    Pantoprazole 20mg tab    [facility]

## 2025-07-07 DIAGNOSIS — E11.9 TYPE 2 DIABETES MELLITUS WITHOUT COMPLICATION, WITH LONG-TERM CURRENT USE OF INSULIN (HCC): ICD-10-CM

## 2025-07-07 DIAGNOSIS — Z79.4 TYPE 2 DIABETES MELLITUS WITHOUT COMPLICATION, WITH LONG-TERM CURRENT USE OF INSULIN (HCC): ICD-10-CM

## 2025-07-07 RX ORDER — INSULIN GLARGINE 100 [IU]/ML
10 INJECTION, SOLUTION SUBCUTANEOUS DAILY
Qty: 3 ML | Refills: 2 | Status: SHIPPED | OUTPATIENT
Start: 2025-07-07